# Patient Record
Sex: FEMALE | Race: WHITE | Employment: OTHER | URBAN - METROPOLITAN AREA
[De-identification: names, ages, dates, MRNs, and addresses within clinical notes are randomized per-mention and may not be internally consistent; named-entity substitution may affect disease eponyms.]

---

## 2023-01-09 ENCOUNTER — HOSPITAL ENCOUNTER (INPATIENT)
Age: 68
LOS: 4 days | Discharge: HOME | End: 2023-01-13
Attending: EMERGENCY MEDICINE | Admitting: INTERNAL MEDICINE
Payer: MEDICARE

## 2023-01-09 ENCOUNTER — APPOINTMENT (OUTPATIENT)
Dept: CT IMAGING | Age: 68
End: 2023-01-09
Payer: MEDICARE

## 2023-01-09 DIAGNOSIS — E66.9 DIABETES MELLITUS TYPE 2 IN OBESE (HCC): ICD-10-CM

## 2023-01-09 DIAGNOSIS — R10.9 ABDOMINAL PAIN IN FEMALE: Primary | ICD-10-CM

## 2023-01-09 DIAGNOSIS — E11.69 DIABETES MELLITUS TYPE 2 IN OBESE (HCC): ICD-10-CM

## 2023-01-09 DIAGNOSIS — N39.0 URINARY TRACT INFECTION IN FEMALE: ICD-10-CM

## 2023-01-09 DIAGNOSIS — R11.2 NAUSEA AND VOMITING, UNSPECIFIED VOMITING TYPE: ICD-10-CM

## 2023-01-09 LAB
A/G RATIO: 1 (ref 1.1–2.2)
ALBUMIN SERPL-MCNC: 3.5 G/DL (ref 3.4–5)
ALP BLD-CCNC: 97 U/L (ref 40–129)
ALT SERPL-CCNC: 10 U/L (ref 10–40)
ANION GAP SERPL CALCULATED.3IONS-SCNC: 11 MMOL/L (ref 3–16)
AST SERPL-CCNC: 16 U/L (ref 15–37)
BACTERIA: ABNORMAL /HPF
BASOPHILS ABSOLUTE: 0 K/UL (ref 0–0.2)
BASOPHILS RELATIVE PERCENT: 0.5 %
BILIRUB SERPL-MCNC: 0.4 MG/DL (ref 0–1)
BILIRUBIN URINE: ABNORMAL
BLOOD, URINE: NEGATIVE
BUN BLDV-MCNC: 14 MG/DL (ref 7–20)
CALCIUM SERPL-MCNC: 9.1 MG/DL (ref 8.3–10.6)
CHLORIDE BLD-SCNC: 102 MMOL/L (ref 99–110)
CLARITY: ABNORMAL
CO2: 24 MMOL/L (ref 21–32)
COLOR: ABNORMAL
CREAT SERPL-MCNC: 1 MG/DL (ref 0.6–1.2)
EOSINOPHILS ABSOLUTE: 0.1 K/UL (ref 0–0.6)
EOSINOPHILS RELATIVE PERCENT: 0.7 %
EPITHELIAL CELLS, UA: 15 /HPF (ref 0–5)
GFR SERPL CREATININE-BSD FRML MDRD: >60 ML/MIN/{1.73_M2}
GLUCOSE BLD-MCNC: 131 MG/DL (ref 70–99)
GLUCOSE BLD-MCNC: 179 MG/DL (ref 70–99)
GLUCOSE URINE: NEGATIVE MG/DL
HCT VFR BLD CALC: 34.6 % (ref 36–48)
HEMOGLOBIN: 11.2 G/DL (ref 12–16)
HYALINE CASTS: 36 /LPF (ref 0–8)
KETONES, URINE: 15 MG/DL
LEUKOCYTE ESTERASE, URINE: ABNORMAL
LIPASE: 7 U/L (ref 13–60)
LYMPHOCYTES ABSOLUTE: 1.9 K/UL (ref 1–5.1)
LYMPHOCYTES RELATIVE PERCENT: 25.7 %
MCH RBC QN AUTO: 26.8 PG (ref 26–34)
MCHC RBC AUTO-ENTMCNC: 32.3 G/DL (ref 31–36)
MCV RBC AUTO: 83.1 FL (ref 80–100)
MICROSCOPIC EXAMINATION: YES
MONOCYTES ABSOLUTE: 0.5 K/UL (ref 0–1.3)
MONOCYTES RELATIVE PERCENT: 7.3 %
NEUTROPHILS ABSOLUTE: 4.9 K/UL (ref 1.7–7.7)
NEUTROPHILS RELATIVE PERCENT: 65.8 %
NITRITE, URINE: POSITIVE
PDW BLD-RTO: 14 % (ref 12.4–15.4)
PERFORMED ON: ABNORMAL
PH UA: 5.5 (ref 5–8)
PLATELET # BLD: 304 K/UL (ref 135–450)
PMV BLD AUTO: 7 FL (ref 5–10.5)
POTASSIUM REFLEX MAGNESIUM: 4.2 MMOL/L (ref 3.5–5.1)
PROTEIN UA: 30 MG/DL
RBC # BLD: 4.17 M/UL (ref 4–5.2)
RBC UA: 2 /HPF (ref 0–4)
SODIUM BLD-SCNC: 137 MMOL/L (ref 136–145)
SPECIFIC GRAVITY UA: 1.02 (ref 1–1.03)
TOTAL PROTEIN: 6.9 G/DL (ref 6.4–8.2)
TROPONIN: <0.01 NG/ML
URINE REFLEX TO CULTURE: YES
URINE TYPE: ABNORMAL
UROBILINOGEN, URINE: 1 E.U./DL
WBC # BLD: 7.5 K/UL (ref 4–11)
WBC UA: 123 /HPF (ref 0–5)

## 2023-01-09 PROCEDURE — 81001 URINALYSIS AUTO W/SCOPE: CPT

## 2023-01-09 PROCEDURE — 80053 COMPREHEN METABOLIC PANEL: CPT

## 2023-01-09 PROCEDURE — 83690 ASSAY OF LIPASE: CPT

## 2023-01-09 PROCEDURE — 87186 SC STD MICRODIL/AGAR DIL: CPT

## 2023-01-09 PROCEDURE — 87086 URINE CULTURE/COLONY COUNT: CPT

## 2023-01-09 PROCEDURE — 96374 THER/PROPH/DIAG INJ IV PUSH: CPT

## 2023-01-09 PROCEDURE — 85025 COMPLETE CBC W/AUTO DIFF WBC: CPT

## 2023-01-09 PROCEDURE — 6360000004 HC RX CONTRAST MEDICATION: Performed by: EMERGENCY MEDICINE

## 2023-01-09 PROCEDURE — 99285 EMERGENCY DEPT VISIT HI MDM: CPT

## 2023-01-09 PROCEDURE — 74177 CT ABD & PELVIS W/CONTRAST: CPT

## 2023-01-09 PROCEDURE — 87088 URINE BACTERIA CULTURE: CPT

## 2023-01-09 PROCEDURE — 84484 ASSAY OF TROPONIN QUANT: CPT

## 2023-01-09 PROCEDURE — 96375 TX/PRO/DX INJ NEW DRUG ADDON: CPT

## 2023-01-09 PROCEDURE — 2580000003 HC RX 258: Performed by: INTERNAL MEDICINE

## 2023-01-09 PROCEDURE — 1200000000 HC SEMI PRIVATE

## 2023-01-09 PROCEDURE — 6360000002 HC RX W HCPCS: Performed by: EMERGENCY MEDICINE

## 2023-01-09 PROCEDURE — 6360000002 HC RX W HCPCS: Performed by: INTERNAL MEDICINE

## 2023-01-09 RX ORDER — SODIUM CHLORIDE 0.9 % (FLUSH) 0.9 %
5-40 SYRINGE (ML) INJECTION PRN
Status: DISCONTINUED | OUTPATIENT
Start: 2023-01-09 | End: 2023-01-13 | Stop reason: HOSPADM

## 2023-01-09 RX ORDER — INSULIN LISPRO 100 [IU]/ML
0-4 INJECTION, SOLUTION INTRAVENOUS; SUBCUTANEOUS NIGHTLY
Status: DISCONTINUED | OUTPATIENT
Start: 2023-01-09 | End: 2023-01-13 | Stop reason: HOSPADM

## 2023-01-09 RX ORDER — METOPROLOL TARTRATE 50 MG/1
50 TABLET, FILM COATED ORAL DAILY
Status: DISCONTINUED | OUTPATIENT
Start: 2023-01-10 | End: 2023-01-11

## 2023-01-09 RX ORDER — SODIUM CHLORIDE 9 MG/ML
INJECTION, SOLUTION INTRAVENOUS PRN
Status: DISCONTINUED | OUTPATIENT
Start: 2023-01-09 | End: 2023-01-13 | Stop reason: HOSPADM

## 2023-01-09 RX ORDER — ONDANSETRON 4 MG/1
4 TABLET, ORALLY DISINTEGRATING ORAL EVERY 8 HOURS PRN
Status: DISCONTINUED | OUTPATIENT
Start: 2023-01-09 | End: 2023-01-13 | Stop reason: HOSPADM

## 2023-01-09 RX ORDER — ENOXAPARIN SODIUM 100 MG/ML
30 INJECTION SUBCUTANEOUS 2 TIMES DAILY
Status: DISCONTINUED | OUTPATIENT
Start: 2023-01-10 | End: 2023-01-13 | Stop reason: HOSPADM

## 2023-01-09 RX ORDER — INSULIN LISPRO 100 [IU]/ML
0-8 INJECTION, SOLUTION INTRAVENOUS; SUBCUTANEOUS
Status: DISCONTINUED | OUTPATIENT
Start: 2023-01-10 | End: 2023-01-13 | Stop reason: HOSPADM

## 2023-01-09 RX ORDER — ONDANSETRON 2 MG/ML
4 INJECTION INTRAMUSCULAR; INTRAVENOUS EVERY 6 HOURS PRN
Status: DISCONTINUED | OUTPATIENT
Start: 2023-01-09 | End: 2023-01-13 | Stop reason: HOSPADM

## 2023-01-09 RX ORDER — SODIUM CHLORIDE 0.9 % (FLUSH) 0.9 %
5-40 SYRINGE (ML) INJECTION EVERY 12 HOURS SCHEDULED
Status: DISCONTINUED | OUTPATIENT
Start: 2023-01-09 | End: 2023-01-13 | Stop reason: HOSPADM

## 2023-01-09 RX ORDER — ACETAMINOPHEN 650 MG/1
650 SUPPOSITORY RECTAL EVERY 6 HOURS PRN
Status: DISCONTINUED | OUTPATIENT
Start: 2023-01-09 | End: 2023-01-13 | Stop reason: HOSPADM

## 2023-01-09 RX ORDER — ACETAMINOPHEN 325 MG/1
650 TABLET ORAL EVERY 6 HOURS PRN
Status: DISCONTINUED | OUTPATIENT
Start: 2023-01-09 | End: 2023-01-13 | Stop reason: HOSPADM

## 2023-01-09 RX ORDER — ONDANSETRON 2 MG/ML
4 INJECTION INTRAMUSCULAR; INTRAVENOUS ONCE
Status: COMPLETED | OUTPATIENT
Start: 2023-01-09 | End: 2023-01-09

## 2023-01-09 RX ORDER — SODIUM CHLORIDE, SODIUM LACTATE, POTASSIUM CHLORIDE, CALCIUM CHLORIDE 600; 310; 30; 20 MG/100ML; MG/100ML; MG/100ML; MG/100ML
INJECTION, SOLUTION INTRAVENOUS CONTINUOUS
Status: DISCONTINUED | OUTPATIENT
Start: 2023-01-09 | End: 2023-01-13 | Stop reason: HOSPADM

## 2023-01-09 RX ORDER — KETOROLAC TROMETHAMINE 30 MG/ML
15 INJECTION, SOLUTION INTRAMUSCULAR; INTRAVENOUS ONCE
Status: COMPLETED | OUTPATIENT
Start: 2023-01-09 | End: 2023-01-09

## 2023-01-09 RX ORDER — POLYETHYLENE GLYCOL 3350 17 G/17G
17 POWDER, FOR SOLUTION ORAL DAILY PRN
Status: DISCONTINUED | OUTPATIENT
Start: 2023-01-09 | End: 2023-01-13 | Stop reason: HOSPADM

## 2023-01-09 RX ADMIN — KETOROLAC TROMETHAMINE 15 MG: 30 INJECTION, SOLUTION INTRAMUSCULAR; INTRAVENOUS at 16:11

## 2023-01-09 RX ADMIN — CEFTRIAXONE SODIUM 1000 MG: 1 INJECTION, POWDER, FOR SOLUTION INTRAMUSCULAR; INTRAVENOUS at 22:43

## 2023-01-09 RX ADMIN — SODIUM CHLORIDE, POTASSIUM CHLORIDE, SODIUM LACTATE AND CALCIUM CHLORIDE: 600; 310; 30; 20 INJECTION, SOLUTION INTRAVENOUS at 22:41

## 2023-01-09 RX ADMIN — IOPAMIDOL 75 ML: 755 INJECTION, SOLUTION INTRAVENOUS at 17:10

## 2023-01-09 RX ADMIN — Medication 10 ML: at 22:42

## 2023-01-09 RX ADMIN — ONDANSETRON 4 MG: 2 INJECTION INTRAMUSCULAR; INTRAVENOUS at 16:10

## 2023-01-09 ASSESSMENT — PAIN SCALES - GENERAL
PAINLEVEL_OUTOF10: 7
PAINLEVEL_OUTOF10: 7

## 2023-01-09 ASSESSMENT — LIFESTYLE VARIABLES: HOW OFTEN DO YOU HAVE A DRINK CONTAINING ALCOHOL: NEVER

## 2023-01-09 ASSESSMENT — PAIN DESCRIPTION - PAIN TYPE: TYPE: ACUTE PAIN;CHRONIC PAIN

## 2023-01-09 ASSESSMENT — PAIN - FUNCTIONAL ASSESSMENT: PAIN_FUNCTIONAL_ASSESSMENT: 0-10

## 2023-01-09 ASSESSMENT — PAIN DESCRIPTION - LOCATION: LOCATION: ABDOMEN

## 2023-01-10 LAB
ANION GAP SERPL CALCULATED.3IONS-SCNC: 13 MMOL/L (ref 3–16)
BUN BLDV-MCNC: 11 MG/DL (ref 7–20)
CALCIUM SERPL-MCNC: 9.2 MG/DL (ref 8.3–10.6)
CHLORIDE BLD-SCNC: 106 MMOL/L (ref 99–110)
CO2: 24 MMOL/L (ref 21–32)
CREAT SERPL-MCNC: 0.9 MG/DL (ref 0.6–1.2)
GFR SERPL CREATININE-BSD FRML MDRD: >60 ML/MIN/{1.73_M2}
GLUCOSE BLD-MCNC: 121 MG/DL (ref 70–99)
GLUCOSE BLD-MCNC: 126 MG/DL (ref 70–99)
GLUCOSE BLD-MCNC: 137 MG/DL (ref 70–99)
GLUCOSE BLD-MCNC: 160 MG/DL (ref 70–99)
PERFORMED ON: ABNORMAL
POTASSIUM REFLEX MAGNESIUM: 3.9 MMOL/L (ref 3.5–5.1)
SODIUM BLD-SCNC: 143 MMOL/L (ref 136–145)

## 2023-01-10 PROCEDURE — 6360000002 HC RX W HCPCS: Performed by: PHYSICIAN ASSISTANT

## 2023-01-10 PROCEDURE — 2580000003 HC RX 258: Performed by: INTERNAL MEDICINE

## 2023-01-10 PROCEDURE — 6370000000 HC RX 637 (ALT 250 FOR IP): Performed by: INTERNAL MEDICINE

## 2023-01-10 PROCEDURE — 36415 COLL VENOUS BLD VENIPUNCTURE: CPT

## 2023-01-10 PROCEDURE — 6370000000 HC RX 637 (ALT 250 FOR IP): Performed by: PHYSICIAN ASSISTANT

## 2023-01-10 PROCEDURE — 6360000002 HC RX W HCPCS: Performed by: INTERNAL MEDICINE

## 2023-01-10 PROCEDURE — C9113 INJ PANTOPRAZOLE SODIUM, VIA: HCPCS | Performed by: PHYSICIAN ASSISTANT

## 2023-01-10 PROCEDURE — 80048 BASIC METABOLIC PNL TOTAL CA: CPT

## 2023-01-10 PROCEDURE — 1200000000 HC SEMI PRIVATE

## 2023-01-10 RX ORDER — BUPRENORPHINE AND NALOXONE 2; .5 MG/1; MG/1
3 FILM, SOLUBLE BUCCAL; SUBLINGUAL DAILY
Status: DISCONTINUED | OUTPATIENT
Start: 2023-01-10 | End: 2023-01-13 | Stop reason: HOSPADM

## 2023-01-10 RX ORDER — METOCLOPRAMIDE HYDROCHLORIDE 5 MG/ML
10 INJECTION INTRAMUSCULAR; INTRAVENOUS EVERY 6 HOURS
Status: DISCONTINUED | OUTPATIENT
Start: 2023-01-10 | End: 2023-01-10

## 2023-01-10 RX ORDER — PANTOPRAZOLE SODIUM 40 MG/10ML
40 INJECTION, POWDER, LYOPHILIZED, FOR SOLUTION INTRAVENOUS DAILY
Status: DISCONTINUED | OUTPATIENT
Start: 2023-01-10 | End: 2023-01-13 | Stop reason: HOSPADM

## 2023-01-10 RX ORDER — METOCLOPRAMIDE HYDROCHLORIDE 5 MG/ML
10 INJECTION INTRAMUSCULAR; INTRAVENOUS ONCE
Status: COMPLETED | OUTPATIENT
Start: 2023-01-10 | End: 2023-01-10

## 2023-01-10 RX ORDER — DEXTROSE MONOHYDRATE 100 MG/ML
INJECTION, SOLUTION INTRAVENOUS CONTINUOUS PRN
Status: DISCONTINUED | OUTPATIENT
Start: 2023-01-10 | End: 2023-01-13 | Stop reason: HOSPADM

## 2023-01-10 RX ORDER — SCOLOPAMINE TRANSDERMAL SYSTEM 1 MG/1
1 PATCH, EXTENDED RELEASE TRANSDERMAL
Status: DISCONTINUED | OUTPATIENT
Start: 2023-01-10 | End: 2023-01-13 | Stop reason: HOSPADM

## 2023-01-10 RX ORDER — BUPRENORPHINE AND NALOXONE 2; .5 MG/1; MG/1
1 FILM, SOLUBLE BUCCAL; SUBLINGUAL DAILY
Status: CANCELLED | OUTPATIENT
Start: 2023-01-10

## 2023-01-10 RX ADMIN — METOPROLOL TARTRATE 50 MG: 50 TABLET, FILM COATED ORAL at 08:45

## 2023-01-10 RX ADMIN — ENOXAPARIN SODIUM 30 MG: 100 INJECTION SUBCUTANEOUS at 22:31

## 2023-01-10 RX ADMIN — PROMETHAZINE HYDROCHLORIDE: 25 INJECTION INTRAMUSCULAR; INTRAVENOUS at 11:11

## 2023-01-10 RX ADMIN — Medication 10 ML: at 20:55

## 2023-01-10 RX ADMIN — PANTOPRAZOLE SODIUM 40 MG: 40 INJECTION, POWDER, FOR SOLUTION INTRAVENOUS at 13:35

## 2023-01-10 RX ADMIN — ONDANSETRON 4 MG: 2 INJECTION INTRAMUSCULAR; INTRAVENOUS at 04:56

## 2023-01-10 RX ADMIN — CEFTRIAXONE SODIUM 1000 MG: 1 INJECTION, POWDER, FOR SOLUTION INTRAMUSCULAR; INTRAVENOUS at 22:37

## 2023-01-10 RX ADMIN — ONDANSETRON 4 MG: 2 INJECTION INTRAMUSCULAR; INTRAVENOUS at 10:24

## 2023-01-10 RX ADMIN — METOCLOPRAMIDE 10 MG: 5 INJECTION, SOLUTION INTRAMUSCULAR; INTRAVENOUS at 13:35

## 2023-01-10 RX ADMIN — ENOXAPARIN SODIUM 30 MG: 100 INJECTION SUBCUTANEOUS at 08:45

## 2023-01-10 RX ADMIN — BUPRENORPHINE AND NALOXONE 3 FILM: 2; .5 FILM BUCCAL; SUBLINGUAL at 16:48

## 2023-01-10 NOTE — PROGRESS NOTES
[facility] PROGRESS NOTE    1/10/2023 9:17 AM        Name: Nathan Dolan . Admitted: 1/9/2023  Primary Care Provider: Mark Reddy (Tel: [phone])      Subjective:  . Patient seen this am. She was actively vomiting in front of me.  at bedside. I did order phenergan and reassessed her later. She was much more comfortable. She had been on norco for 8 years. She weaned off of these late last year. She then started using marijuana gummies then went to [facility] and is on suboxone which was recently increased to 6 mg daily. She has had almost intractable vomiting since early December although her  says she can go 2 or 3 days without vomiting. They do not think she has been losing weight despite all the vomiting. She could not complete gastric emptying scan the other day due to vomiting. Denies abdominal pain. Does have chronic constipation since she has been on opiates. Has never been on a bowel regimen. She does have urinary frequency but denies dysuria.  \      Reviewed interval ancillary notes    Current Medications  metoprolol tartrate (LOPRESSOR) tablet 50 mg, Daily  cefTRIAXone (ROCEPHIN) 1,000 mg in dextrose 5 % 50 mL IVPB mini-bag, Q24H  insulin lispro (HUMALOG) injection vial 0-8 Units, TID WC  insulin lispro (HUMALOG) injection vial 0-4 Units, Nightly  sodium chloride flush 0.9 % injection 5-40 mL, 2 times per day  sodium chloride flush 0.9 % injection 5-40 mL, PRN  0.9 % sodium chloride infusion, PRN  enoxaparin Sodium (LOVENOX) injection 30 mg, BID  ondansetron (ZOFRAN-ODT) disintegrating tablet 4 mg, Q8H PRN   Or  ondansetron (ZOFRAN) injection 4 mg, Q6H PRN  polyethylene glycol (GLYCOLAX) packet 17 g, Daily PRN  acetaminophen (TYLENOL) tablet 650 mg, Q6H PRN   Or  acetaminophen (TYLENOL) suppository 650 mg, Q6H PRN  lactated ringers infusion, Continuous        Objective:  BP (!) 140/91   Pulse 78   Temp 98 °F (36.7 °C) (Oral)   Resp 18   Ht 5' 6\" (1.676 m)   Wt 240 lb (108.9 kg)   SpO2 97%   BMI 38.74 kg/m²   No intake or output data in the 24 hours ending 01/10/23 0917   Wt Readings from Last 3 Encounters:   01/09/23 240 lb (108.9 kg)       General appearance:  Appears comfortable after phenergan  Eyes: Sclera clear. Pupils equal.  ENT: Moist oral mucosa. Trachea midline, no adenopathy. Cardiovascular: Regular rhythm, normal S1, S2. No murmur. No edema in lower extremities  Respiratory: Not using accessory muscles. Good inspiratory effort. Clear to auscultation bilaterally, no wheeze or crackles. GI: Abdomen soft, no tenderness, not distended, normal bowel sounds  Musculoskeletal: No cyanosis in digits, neck supple  Neurology: CN 2-12 grossly intact. No speech or motor deficits  Psych: Normal affect. Alert and oriented in time, place and person  Skin: Warm, dry, normal turgor    Labs and Tests:  CBC:   Recent Labs     01/09/23  1605   WBC 7.5   HGB 11.2*        BMP:    Recent Labs     01/09/23  1605 01/10/23  0521    143   K 4.2 3.9    106   CO2 24 24   BUN 14 11   CREATININE 1.0 0.9   GLUCOSE 179* 121*     Hepatic:   Recent Labs     01/09/23  1605   AST 16   ALT 10   BILITOT 0.4   ALKPHOS 97     C abd/pelvis  Impression:     1. Moderate-sized hiatus hernia. 2. Negative for bowel obstruction. Problem List  Principal Problem:    UTI (urinary tract infection)  Resolved Problems:    * No resolved hospital problems. *       Assessment & Plan:   UTI-continue rocephin pending cultures  Intractable nausea and vomiting-CT unremarkable. IV phenergan added. Give dose of reglan. ? EGD vs gastric emptying scan once she can tolerate. Dm  2-on amaryl and [place] at home. SSI for now      Diet: ADULT DIET;  Full Liquid; 5 carb choices (75 gm/meal)  Code:Full Code        Elana Ngo PA-C   1/10/2023 9:17 AM

## 2023-01-10 NOTE — PLAN OF CARE
Problem: Discharge Planning  Goal: Discharge to home or other facility with appropriate resources  Outcome: Progressing  Flowsheets (Taken 1/9/2023 2372)  Discharge to home or other facility with appropriate resources:   Identify barriers to discharge with patient and caregiver   Arrange for needed discharge resources and transportation as appropriate     Problem: Safety - Adult  Goal: Free from fall injury  Outcome: Progressing

## 2023-01-10 NOTE — CARE COORDINATION
Discharge Planning Note:    Chart reviewed and it appears that patient has minimal needs for discharge at this time. Discussed with patient and requested that case management be notified if discharge needs are identified.     - Current discharge plan is for the patient to return home. - PCP: Becca Bertrand    Case management will continue to follow progress and update discharge plan as needed.       Risk of Readmission Score: 4%    AREN Aguirre RN    [facility]  Phone: [phone]

## 2023-01-10 NOTE — PLAN OF CARE
Problem: Discharge Planning  Goal: Discharge to home or other facility with appropriate resources  1/10/2023 0842 by Laureano Hardwick RN  Outcome: Progressing  1/10/2023 0155 by Alverto Rowland RN  Outcome: Progressing  Flowsheets (Taken 1/9/2023 2245)  Discharge to home or other facility with appropriate resources:   Identify barriers to discharge with patient and caregiver   Arrange for needed discharge resources and transportation as appropriate     Problem: Safety - Adult  Goal: Free from fall injury  1/10/2023 0842 by Laureano Hardwick RN  Outcome: Progressing  1/10/2023 0155 by Alverto Rowland RN  Outcome: Progressing

## 2023-01-10 NOTE — H&P
HOSPITALISTS HISTORY AND PHYSICAL    1/9/2023 7:19 PM    Patient Information:  Kristina Schwartz is a 79 y.o. female [id number]  PCP:  Gideon Segal (Tel: [phone] )    Chief complaint:    Chief Complaint   Patient presents with    Abdominal Pain     Arrived per family d/t abd pain, vomiting, bowel inpaction PCP called and gave dx and told to come to ER       History of Present Illness:  Gustavo Dailey is a 79 y.o. female   Hospital by her GI doctor as patient has been having persistent nausea with vomiting unable to keep food down for the last couple days has a history of dysphagia denies any chest pain fevers but is having some chills no burning with urination but having frequent urination. REVIEW OF SYSTEMS:   Constitutional: see above  ENT: Negative for rhinorrhea, epistaxis, hoarseness, sore throat. Respiratory: Negative for shortness of breath,wheezing  Cardiovascular: Negative for chest pain, palpitations   Gastrointestinal:see above  Genitourinary: Negative for polyuria, dysuria   Hematologic/Lymphatic: Negative for bleeding tendency, easy bruising  Musculoskeletal: Negative for myalgias and arthralgias  Neurologic: Negative for confusion,dysarthria. Skin: Negative for itching,rash  Psychiatric: Negative for depression,anxiety, agitation. Endocrine: Negative for polydipsia,polyuria,heat /cold intolerance. Past Medical History:   has a past medical history of Anxiety, Diabetes mellitus ([address] Hyperlipidemia, MVP (mitral valve prolapse), and Neck pain. Past Surgical History:   has a past surgical history that includes Cholecystectomy and Hysterectomy. Medications:  No current facility-administered medications on file prior to encounter.      Current Outpatient Medications on File Prior to Encounter   Medication Sig Dispense Refill    sitaGLIPtan (JANUVIA) 100 MG tablet Take 100 mg by mouth daily. HYDROcodone-acetaminophen (NORCO) 5-325 MG per tablet Take 1 tablet by mouth every 6 hours as needed. metformin (GLUCOPHAGE) 1000 MG tablet Take 1,000 mg by mouth 2 times daily (with meals). glimepiride (AMARYL) 4 MG tablet Take 4 mg by mouth 2 times daily. citalopram (CELEXA) 40 MG tablet Take 40 mg by mouth See Admin Instructions. 1 1/2 TABS DAILY      metoprolol (LOPRESSOR) 50 MG tablet Take 50 mg by mouth daily. estrogens conjugated, synthetic A, (CENESTIN) 1.25 MG tablet Take 1.25 mg by mouth daily. Allergies:  No Known Allergies     Social History:  Patient Lives at home   reports that she has never smoked. She does not have any smokeless tobacco history on file. She reports that she does not drink alcohol and does not use drugs. Family History:  unaware  Physical Exam:  BP (!) 144/71   Pulse 56   Temp 98.1 °F (36.7 °C)   Resp 14   Ht 5' 6\" (1.676 m)   Wt 240 lb (108.9 kg)   SpO2 97%   BMI 38.74 kg/m²     General appearance:  Appears comfortable. AAOx3  HEENT: atraumatic, Pupils equal, muscous membranes moist, no masses appreciated  Cardiovascular: Regular rate and rhythm no murmurs appreciated  Respiratory: CTAB no wheezing  Gastrointestinal: Abdomen soft, non-tender, BS+  EXT: no edema  Neurology: no gross focal deficts  Psychiatry: Appropriate affect.  Not agitated  Skin: Warm, dry, no rashes appreciated    Labs:  CBC:   Lab Results   Component Value Date/Time    WBC 7.5 01/09/2023 04:05 PM    RBC 4.17 01/09/2023 04:05 PM    HGB 11.2 01/09/2023 04:05 PM    HCT 34.6 01/09/2023 04:05 PM    MCV 83.1 01/09/2023 04:05 PM    MCH 26.8 01/09/2023 04:05 PM    MCHC 32.3 01/09/2023 04:05 PM    RDW 14.0 01/09/2023 04:05 PM     01/09/2023 04:05 PM    MPV 7.0 01/09/2023 04:05 PM     BMP:    Lab Results   Component Value Date/Time     01/09/2023 04:05 PM    K 4.2 01/09/2023 04:05 PM     01/09/2023 04:05 PM    CO2 24 01/09/2023 04:05 PM    BUN 14 01/09/2023 04:05 PM    CREATININE 1.0 01/09/2023 04:05 PM    CALCIUM 9.1 01/09/2023 04:05 PM    LABGLOM >60 01/09/2023 04:05 PM    GLUCOSE 179 01/09/2023 04:05 PM     CT ABDOMEN PELVIS W IV CONTRAST Additional Contrast? None   Final Result   1. Moderate-sized hiatus hernia. 2. Negative for bowel obstruction. Recent imaging reviewed    Problem List  Principal Problem:    UTI (urinary tract infection)  Resolved Problems:    * No resolved hospital problems. *        Assessment/Plan:   Uti   Iv atbx   Fu cutlurs    Peristent nausea:   -zofran  - start on full liquids  = sent in by Dr. Melissa Ambrose will get on board    Dm2 ssi    DVT prophylaxis lovenox  Code status full code        Admit as inpatient I anticipate hospitalization spanning more than two midnights for investigation and treatment of the above medically necessary diagnoses. Please note that some part of this chart was generated using Dragon dictation software. Although every effort was made to ensure the accuracy of this automated transcription, some errors in transcription may have occurred inadvertently. If you may need any clarification, please do not hesitate to contact me through [facility].        Eleno Oneil MD    1/9/2023 7:19 PM

## 2023-01-10 NOTE — ED PROVIDER NOTES
[id number] [facility] Emergency Department      Pt Name: Dorothy Mota  MRN: [id number]  [place] 1955  Date of evaluation: 1/9/2023  Provider: Ofelia Conway MD  CHIEF COMPLAINT  Chief Complaint   Patient presents with    Abdominal Pain     Arrived per family d/t abd pain, vomiting, bowel inpaction PCP called and gave dx and told to come to ER       HPI  Dorothy Mota is a 79 y.o. female who presents because of abdominal pain. She has been seeing GI, has continued symptoms. She has had nausea and vomiting since last week with pain. She points to the central abdomen. She denies chest pain or sob. No blood in emesis and last emesis was yesterday. Her ability to eat is very limited. She was sent to the hospital for admission. Denies any known fever. PSH of cholecystectomy and hysterectomy. REVIEW OF SYSTEMS:  No fever, no breathing difficulty, no dysuria Pertinent positives and negatives as per the HPI. All other pertinent review of systems reviewed and negative. Nursing notes reviewed. PAST MEDICAL HISTORY:  Past Medical History:   Diagnosis Date    Anxiety     Diabetes mellitus ([address]     Hyperlipidemia     MVP (mitral valve prolapse)     Neck pain      SURGICAL HISTORY  Past Surgical History:   Procedure Laterality Date    CHOLECYSTECTOMY      HYSTERECTOMY (CERVIX STATUS UNKNOWN)       MEDICATIONS:  No current facility-administered medications on file prior to encounter. Current Outpatient Medications on File Prior to Encounter   Medication Sig Dispense Refill    sitaGLIPtan (JANUVIA) 100 MG tablet Take 100 mg by mouth daily. (Patient not taking: Reported on 1/9/2023)      HYDROcodone-acetaminophen (NORCO) 5-325 MG per tablet Take 1 tablet by mouth every 6 hours as needed. metformin (GLUCOPHAGE) 1000 MG tablet Take 1,000 mg by mouth 2 times daily (with meals). glimepiride (AMARYL) 4 MG tablet Take 4 mg by mouth 2 times daily.  (Patient not taking: Reported on 1/9/2023) citalopram (CELEXA) 40 MG tablet Take 40 mg by mouth See Admin Instructions. 1 1/2 TABS DAILY      metoprolol (LOPRESSOR) 50 MG tablet Take 50 mg by mouth daily. estrogens conjugated, synthetic A, (CENESTIN) 1.25 MG tablet Take 1.25 mg by mouth daily. (Patient not taking: Reported on 1/9/2023)       ALLERGIES  Patient has no known allergies. FAMILY HISTORY:  History reviewed. No pertinent family history. SOCIAL HISTORY:  Social History     Tobacco Use    Smoking status: Never   Substance Use Topics    Alcohol use: No    Drug use: No     IMMUNIZATIONS:  Noncontributory    PHYSICAL EXAM  VITAL SIGNS: /78   Pulse 61   Temp 98.2 °F (36.8 °C) (Oral)   Resp 16   Ht 5' 6\" (1.676 m)   Wt 240 lb (108.9 kg)   SpO2 98%   BMI 38.74 kg/m²   Constitutional:  79 y.o. female cooperative  HENT:  Atraumatic, mucous membranes moist  Eyes:   Conjunctiva clear, no icterus  Neck:  Supple, no adenopathy  Cardiovascular:  Regular, no rubs  Thorax & Lungs:  No accessory muscle usage, clear  Abdomen:  Soft, non distended, bowel sounds present, mild lower tenderness without r/g  Back:  No deformity  Genitalia:  Deferred  Rectal:  Deferred  Extremities:  No cyanosis, no edema  Skin:  Warm, dry, no rash of exposed areas  Neurologic:  Alert, no slurred speech  Psychiatric:  Affect appropriate    RESULTS / MEDICAL DECISION MAKING:  Labs resulted at the time of this note reviewed.    Labs Reviewed   CBC WITH AUTO DIFFERENTIAL - Abnormal; Notable for the following components:       Result Value    Hemoglobin 11.2 (*)     Hematocrit 34.6 (*)     All other components within normal limits   COMPREHENSIVE METABOLIC PANEL W/ REFLEX TO MG FOR LOW K - Abnormal; Notable for the following components:    Glucose 179 (*)     Albumin/Globulin Ratio 1.0 (*)     All other components within normal limits   LIPASE - Abnormal; Notable for the following components:    Lipase 7.0 (*)     All other components within normal limits   URINALYSIS WITH REFLEX TO CULTURE - Abnormal; Notable for the following components:    Color, UA DARK YELLOW (*)     Clarity, UA TURBID (*)     Bilirubin Urine SMALL (*)     Ketones, Urine 15 (*)     Protein, UA 30 (*)     Nitrite, Urine POSITIVE (*)     Leukocyte Esterase, Urine MODERATE (*)     All other components within normal limits   MICROSCOPIC URINALYSIS - Abnormal; Notable for the following components:    Bacteria, UA 4+ (*)     Hyaline Casts, UA 36 (*)     WBC,  (*)     Epithelial Cells, UA 15 (*)     All other components within normal limits   POCT GLUCOSE - Abnormal; Notable for the following components:    POC Glucose 131 (*)     All other components within normal limits   CULTURE, URINE   TROPONIN   BASIC METABOLIC PANEL W/ REFLEX TO MG FOR LOW K     RADIOLOGY:   CT ABDOMEN PELVIS W IV CONTRAST Additional Contrast? None   Final Result   1. Moderate-sized hiatus hernia. 2. Negative for bowel obstruction.            ED COURSE:  Medications administered:  Medications   metoprolol tartrate (LOPRESSOR) tablet 50 mg (has no administration in time range)   cefTRIAXone (ROCEPHIN) 1,000 mg in dextrose 5 % 50 mL IVPB mini-bag (0 mg IntraVENous Stopped 1/9/23 2354)   insulin lispro (HUMALOG) injection vial 0-8 Units (has no administration in time range)   insulin lispro (HUMALOG) injection vial 0-4 Units (0 Units SubCUTAneous Not Given 1/9/23 2242)   sodium chloride flush 0.9 % injection 5-40 mL (10 mLs IntraVENous Given 1/9/23 2242)   sodium chloride flush 0.9 % injection 5-40 mL (has no administration in time range)   0.9 % sodium chloride infusion (has no administration in time range)   enoxaparin Sodium (LOVENOX) injection 30 mg (has no administration in time range)   ondansetron (ZOFRAN-ODT) disintegrating tablet 4 mg (has no administration in time range)     Or   ondansetron (ZOFRAN) injection 4 mg (has no administration in time range)   polyethylene glycol (GLYCOLAX) packet 17 g (has no administration in time range)   acetaminophen (TYLENOL) tablet 650 mg (has no administration in time range)     Or   acetaminophen (TYLENOL) suppository 650 mg (has no administration in time range)   lactated ringers infusion ( IntraVENous New Bag 1/9/23 2241)   ondansetron (ZOFRAN) injection 4 mg (4 mg IntraVENous Given 1/9/23 1610)   ketorolac (TORADOL) injection 15 mg (15 mg IntraVENous Given 1/9/23 1611)   iopamidol (ISOVUE-370) 76 % injection 75 mL (75 mLs IntraVENous Given 1/9/23 1710)     History from : Patient  Limitations to history : pt poor historian  Chronic Conditions:  has a past medical history of Anxiety, Diabetes mellitus ([address] Hyperlipidemia, MVP (mitral valve prolapse), and Neck pain. Discussion with Other Profesionals : Admitting Team and Dr Kerwin Devries : Outpatient Labs & Studies x rays, gi test  Disposition Considerations (Tests not ordered but considered, Shared Decision Making, Pt Expectation of Test or Tx.):  Amylase considered but not ordered due to nonspecific nature of the test      PROCEDURES:  None    CRITICAL CARE:  None    CONSULTATIONS:  Hospitalist, Dr Sheila Rosario is a 79 y.o. female who presented because of abdominal pain. She does have UTI. Abx initiated. Her GI specialist requests admission. He tried to admit her directly. I discussed the case in full with the admitting physician. Differential Diagnosis: appendicitis, obstruction, perforated viscus, intestinal ischemia, AAA dissection, other    FINAL IMPRESSION:    1. Abdominal pain in female    2. Urinary tract infection in female        (Please note that I used voice recognition software to generate this note.   Occasionally words are mistranscribed despite my efforts to edit errors.)       Raul Slaughter MD  01/10/23 4474

## 2023-01-10 NOTE — PROGRESS NOTES
4 Eyes Skin Assessment     NAME:  Olivia Almanza  YOB: 1955  MEDICAL RECORD NUMBER:  [id number]    The patient is being assessed for  Admission    I agree that One RN have performed a thorough Head to Toe Skin Assessment on the patient. ALL assessment sites listed below have been assessed. Areas assessed by both nurses:    Head, Face, Ears, Shoulders, Back, Chest, Arms, Elbows, Hands, Sacrum. Buttock, Coccyx, Ischium, and Legs. Feet and Heels        Does the Patient have a Wound?  No noted wound(s)       Himanshu Prevention initiated by RN: Yes   Wound Care Orders initiated by RN: NA    Pressure Injury (Stage 3,4, Unstageable, DTI, NWPT, and Complex wounds) if present place referral order by RN under : NA    New and Established Ostomies, if present place, referral order under : NA      Nurse 1 eSignature: Electronically signed by Alverto Rowland RN on 1/10/23 at 6:19 AM EST    **SHARE this note so that the co-signing nurse is able to place an eSignature**    Nurse 2 eSignature: {Esignature:[id number]}

## 2023-01-10 NOTE — ED NOTES
Report given to [facility] RN. No questions or concerns at this time.      Lexie Liu RN  01/09/23 0331

## 2023-01-10 NOTE — CONSULTS
Gastroenterology Consult Note        Patient: Paras Varner  : 1955  Acct#:      Date:  1/10/2023    Subjective:       History of Present Illness  Patient is a 79 y.o.  female admitted with UTI (urinary tract infection) [N39.0] who is seen in consult for N/V. H/o DM, chronic back pain. She had been on vicodin (weaned off in Dec) but now on suboxone. S/p cholecystectomy. S/p hiatal hernia repair. She reports nausea and vomiting since 2022. This has worsened recently. Can occur in the morning and then no further N/V that day. May get a vague epigastric discomfort with vomiting. H/o constipation. Can go a week without a BM. Saw Dr Moise Munoz as new pt late Dec. Had AXR that was suggestive of constipation. Upper GI with SBFT was done which showed recurrent HH, reflux to lower thoracic esophagus, and was suggestive of esophageal dysmotility. Plan was for GES and EGD and colonoscopy. Occasional solid food and liquid dysphagia for a few months. 10 pound unintentional weight loss recently. No melena, hematochezia, hematemesis. Also taking edibles for N/V. Past Medical History:   Diagnosis Date    Anxiety     Diabetes mellitus ([address]     Hyperlipidemia     MVP (mitral valve prolapse)     Neck pain       Past Surgical History:   Procedure Laterality Date    CHOLECYSTECTOMY      HYSTERECTOMY (CERVIX STATUS UNKNOWN)        Past Endoscopic History:      Admission Meds  No current facility-administered medications on file prior to encounter. Current Outpatient Medications on File Prior to Encounter   Medication Sig Dispense Refill    sitaGLIPtan (JANUVIA) 100 MG tablet Take 100 mg by mouth daily. (Patient not taking: Reported on 2023)      HYDROcodone-acetaminophen (NORCO) 5-325 MG per tablet Take 1 tablet by mouth every 6 hours as needed. metformin (GLUCOPHAGE) 1000 MG tablet Take 1,000 mg by mouth 2 times daily (with meals).       glimepiride (AMARYL) 4 MG tablet Take 4 mg by mouth 2 times daily. (Patient not taking: Reported on 1/9/2023)      citalopram (CELEXA) 40 MG tablet Take 40 mg by mouth See Admin Instructions. 1 1/2 TABS DAILY      metoprolol (LOPRESSOR) 50 MG tablet Take 50 mg by mouth daily. estrogens conjugated, synthetic A, (CENESTIN) 1.25 MG tablet Take 1.25 mg by mouth daily. (Patient not taking: Reported on 1/9/2023)              Allergies  No Known Allergies   Social   Social History     Tobacco Use    Smoking status: Never    Smokeless tobacco: Not on file   Substance Use Topics    Alcohol use: No        History reviewed. No pertinent family history. Review of Systems  Pertinent items are noted in HPI. Physical Exam  Blood pressure (!) 148/71, pulse 63, temperature 99.4 °F (37.4 °C), temperature source Oral, resp. rate 18, height 5' 6\" (1.676 m), weight 240 lb (108.9 kg), SpO2 100 %. General appearance: alert, cooperative, no distress, appears stated age  Eyes: Anicteric  Head: Normocephalic, without obvious abnormality  Lungs: clear to auscultation bilaterally, Normal Effort  Heart: regular rate and rhythm, normal S1 and S2, no murmurs or rubs  Abdomen: soft, non-tender. Bowel sounds normal. No masses,  no organomegaly. Extremities: atraumatic, no cyanosis or edema  Skin: warm and dry, no jaundice  Neuro: Grossly intact, A&OX3  Musculoskeletal: 5/5  strength BUE      Data Review:    Recent Labs     01/09/23  1605   WBC 7.5   HGB 11.2*   HCT 34.6*   MCV 83.1        Recent Labs     01/09/23  1605 01/10/23  0521    143   K 4.2 3.9    106   CO2 24 24   BUN 14 11   CREATININE 1.0 0.9     Recent Labs     01/09/23  1605   AST 16   ALT 10   BILITOT 0.4   ALKPHOS 97     Recent Labs     01/09/23  1605   LIPASE 7.0*     No results for input(s): PROTIME, INR in the last 72 hours. No results for input(s): PTT in the last 72 hours. No results for input(s): OCCULTBLD in the last 72 hours.     Imaging Studies: CT-scan of abdomen and pelvis w iv contrast 1/9/23:  Impression   1. Moderate-sized hiatus hernia. 2. Negative for bowel obstruction. Assessment:     Nausea and vomiting - began in Sept 2022 and symptoms have been worsening lately. Upper GI showed recurrence of hiatal hernia. There was concern for esophageal dysmotility and she does report dysphagia as well. Imaging also c/w constipation. Will need EGD to eval upper GI tract. Can place NJ tube so can give bowel prep for catharsis. Will need GES at some point but do not think she will tolerate this currently. Constipation     Recommendations:   - PPI IV  - antiemetics  - Scoplamine patch  - movantik. If no improvement, can try linzess. - EGD with NJ tube placement tomorrow. Can give more laxatives such as bowel prep via NJ tube. - Will need GES at some point but do not think she will tolerate this currently. Discussed with Dr. Joseph Bass, [facility]  [address]    Patient was seen and examined with nurse practitioner, I was present for all parts of history and physical exam, I helped formulate and finalize plan of care. 80 yo patient with narcotic dependence has nausea, vomiting, constipation, but denies significant abdominal pain.   Has evidence of recurrent hiatal hernia on UGI study  Delayed liquid phase emptying ( 2 hr study), will need 4 hr GES    Plan for EGD in AM  Scop patch  Will discuss possible regaln- patient states she did not try this due to concern for drug interaction  Will try movantik or linzess for constipation

## 2023-01-11 ENCOUNTER — ANESTHESIA EVENT (OUTPATIENT)
Dept: ENDOSCOPY | Age: 68
End: 2023-01-11
Payer: MEDICARE

## 2023-01-11 ENCOUNTER — ANESTHESIA (OUTPATIENT)
Dept: ENDOSCOPY | Age: 68
End: 2023-01-11
Payer: MEDICARE

## 2023-01-11 LAB
GLUCOSE BLD-MCNC: 119 MG/DL (ref 70–99)
GLUCOSE BLD-MCNC: 134 MG/DL (ref 70–99)
GLUCOSE BLD-MCNC: 136 MG/DL (ref 70–99)
GLUCOSE BLD-MCNC: 157 MG/DL (ref 70–99)
GLUCOSE BLD-MCNC: 159 MG/DL (ref 70–99)
ORGANISM: ABNORMAL
PERFORMED ON: ABNORMAL
URINE CULTURE, ROUTINE: ABNORMAL

## 2023-01-11 PROCEDURE — 1200000000 HC SEMI PRIVATE

## 2023-01-11 PROCEDURE — 2580000003 HC RX 258: Performed by: INTERNAL MEDICINE

## 2023-01-11 PROCEDURE — 6370000000 HC RX 637 (ALT 250 FOR IP): Performed by: INTERNAL MEDICINE

## 2023-01-11 PROCEDURE — 3609012400 HC EGD TRANSORAL BIOPSY SINGLE/MULTIPLE: Performed by: INTERNAL MEDICINE

## 2023-01-11 PROCEDURE — 2709999900 HC NON-CHARGEABLE SUPPLY: Performed by: INTERNAL MEDICINE

## 2023-01-11 PROCEDURE — 2500000003 HC RX 250 WO HCPCS: Performed by: NURSE ANESTHETIST, CERTIFIED REGISTERED

## 2023-01-11 PROCEDURE — 3700000000 HC ANESTHESIA ATTENDED CARE: Performed by: INTERNAL MEDICINE

## 2023-01-11 PROCEDURE — 0DB68ZX EXCISION OF STOMACH, VIA NATURAL OR ARTIFICIAL OPENING ENDOSCOPIC, DIAGNOSTIC: ICD-10-PCS | Performed by: INTERNAL MEDICINE

## 2023-01-11 PROCEDURE — 88305 TISSUE EXAM BY PATHOLOGIST: CPT

## 2023-01-11 PROCEDURE — 6360000002 HC RX W HCPCS: Performed by: PHYSICIAN ASSISTANT

## 2023-01-11 PROCEDURE — 7100000001 HC PACU RECOVERY - ADDTL 15 MIN: Performed by: INTERNAL MEDICINE

## 2023-01-11 PROCEDURE — C9113 INJ PANTOPRAZOLE SODIUM, VIA: HCPCS | Performed by: PHYSICIAN ASSISTANT

## 2023-01-11 PROCEDURE — 2580000003 HC RX 258: Performed by: NURSE ANESTHETIST, CERTIFIED REGISTERED

## 2023-01-11 PROCEDURE — 7100000000 HC PACU RECOVERY - FIRST 15 MIN: Performed by: INTERNAL MEDICINE

## 2023-01-11 PROCEDURE — 2580000003 HC RX 258: Performed by: ANESTHESIOLOGY

## 2023-01-11 PROCEDURE — 6360000002 HC RX W HCPCS: Performed by: INTERNAL MEDICINE

## 2023-01-11 PROCEDURE — 6360000002 HC RX W HCPCS: Performed by: NURSE ANESTHETIST, CERTIFIED REGISTERED

## 2023-01-11 RX ORDER — PROMETHAZINE HYDROCHLORIDE 25 MG/1
25 SUPPOSITORY RECTAL EVERY 6 HOURS PRN
Status: DISCONTINUED | OUTPATIENT
Start: 2023-01-11 | End: 2023-01-13 | Stop reason: HOSPADM

## 2023-01-11 RX ORDER — PROMETHAZINE HYDROCHLORIDE 25 MG/1
25 TABLET ORAL EVERY 6 HOURS PRN
Status: DISCONTINUED | OUTPATIENT
Start: 2023-01-11 | End: 2023-01-13 | Stop reason: HOSPADM

## 2023-01-11 RX ORDER — SODIUM PHOSPHATE, DIBASIC AND SODIUM PHOSPHATE, MONOBASIC 7; 19 G/133ML; G/133ML
1 ENEMA RECTAL EVERY 12 HOURS
Status: DISPENSED | OUTPATIENT
Start: 2023-01-11 | End: 2023-01-12

## 2023-01-11 RX ORDER — PROPOFOL 10 MG/ML
INJECTION, EMULSION INTRAVENOUS CONTINUOUS PRN
Status: DISCONTINUED | OUTPATIENT
Start: 2023-01-11 | End: 2023-01-11 | Stop reason: SDUPTHER

## 2023-01-11 RX ORDER — SODIUM CHLORIDE 9 MG/ML
INJECTION, SOLUTION INTRAVENOUS CONTINUOUS
Status: DISCONTINUED | OUTPATIENT
Start: 2023-01-11 | End: 2023-01-12

## 2023-01-11 RX ORDER — LIDOCAINE HYDROCHLORIDE 20 MG/ML
INJECTION, SOLUTION EPIDURAL; INFILTRATION; INTRACAUDAL; PERINEURAL PRN
Status: DISCONTINUED | OUTPATIENT
Start: 2023-01-11 | End: 2023-01-11 | Stop reason: SDUPTHER

## 2023-01-11 RX ORDER — SODIUM CHLORIDE 9 MG/ML
INJECTION, SOLUTION INTRAVENOUS CONTINUOUS PRN
Status: DISCONTINUED | OUTPATIENT
Start: 2023-01-11 | End: 2023-01-11 | Stop reason: SDUPTHER

## 2023-01-11 RX ADMIN — CEFTRIAXONE SODIUM 1000 MG: 1 INJECTION, POWDER, FOR SOLUTION INTRAMUSCULAR; INTRAVENOUS at 22:43

## 2023-01-11 RX ADMIN — METOPROLOL TARTRATE 50 MG: 50 TABLET, FILM COATED ORAL at 08:11

## 2023-01-11 RX ADMIN — SODIUM CHLORIDE: 9 INJECTION, SOLUTION INTRAVENOUS at 11:08

## 2023-01-11 RX ADMIN — LIDOCAINE HYDROCHLORIDE 100 MG: 20 INJECTION, SOLUTION EPIDURAL; INFILTRATION; INTRACAUDAL; PERINEURAL at 11:13

## 2023-01-11 RX ADMIN — PANTOPRAZOLE SODIUM 40 MG: 40 INJECTION, POWDER, FOR SOLUTION INTRAVENOUS at 08:08

## 2023-01-11 RX ADMIN — BUPRENORPHINE AND NALOXONE 3 FILM: 2; .5 FILM BUCCAL; SUBLINGUAL at 08:11

## 2023-01-11 RX ADMIN — ONDANSETRON 4 MG: 2 INJECTION INTRAMUSCULAR; INTRAVENOUS at 17:44

## 2023-01-11 RX ADMIN — SODIUM CHLORIDE: 9 INJECTION, SOLUTION INTRAVENOUS at 09:53

## 2023-01-11 RX ADMIN — ENOXAPARIN SODIUM 30 MG: 100 INJECTION SUBCUTANEOUS at 20:37

## 2023-01-11 RX ADMIN — SODIUM PHOSPHATE, DIBASIC AND SODIUM PHOSPHATE, MONOBASIC 1 ENEMA: 7; 19 ENEMA RECTAL at 12:55

## 2023-01-11 RX ADMIN — PROPOFOL 120 MCG/KG/MIN: 10 INJECTION, EMULSION INTRAVENOUS at 11:13

## 2023-01-11 ASSESSMENT — PAIN - FUNCTIONAL ASSESSMENT: PAIN_FUNCTIONAL_ASSESSMENT: NONE - DENIES PAIN

## 2023-01-11 ASSESSMENT — LIFESTYLE VARIABLES: SMOKING_STATUS: 0

## 2023-01-11 ASSESSMENT — PAIN SCALES - GENERAL: PAINLEVEL_OUTOF10: 5

## 2023-01-11 NOTE — PROGRESS NOTES
[facility] PROGRESS NOTE    1/11/2023 4:36 PM        Name: Shyam Cruz . Admitted: 1/9/2023  Primary Care Provider: Son Rebolledo (Tel: [phone])      Subjective:  . Patient seen this am.Feeling better today. No vomiting since yesterday. Going for EGD, has loose BM.          Reviewed interval ancillary notes    Current Medications  0.9 % sodium chloride infusion, Continuous  promethazine (PHENERGAN) suppository 25 mg, Q6H PRN  promethazine (PHENERGAN) tablet 25 mg, Q6H PRN  sodium phosphate (FLEET) rectal enema 1 enema, Q12H  promethazine (PHENERGAN) 6.25 mg in sodium chloride 0.9 % 50 mL IVPB, Q6H PRN  naloxegol (MOVANTIK) tablet 25 mg, QAM AC  scopolamine (TRANSDERM-SCOP) transdermal patch 1 patch, Q72H  pantoprazole (PROTONIX) injection 40 mg, Daily  buprenorphine-naloxone (SUBOXONE) 2-0.5 MG SL film 3 Film, Daily  dextrose bolus 10% 125 mL, PRN   Or  dextrose bolus 10% 250 mL, PRN  glucagon (rDNA) injection 1 mg, PRN  dextrose 10 % infusion, Continuous PRN  cefTRIAXone (ROCEPHIN) 1,000 mg in dextrose 5 % 50 mL IVPB mini-bag, Q24H  insulin lispro (HUMALOG) injection vial 0-8 Units, TID WC  insulin lispro (HUMALOG) injection vial 0-4 Units, Nightly  sodium chloride flush 0.9 % injection 5-40 mL, 2 times per day  sodium chloride flush 0.9 % injection 5-40 mL, PRN  0.9 % sodium chloride infusion, PRN  enoxaparin Sodium (LOVENOX) injection 30 mg, BID  ondansetron (ZOFRAN-ODT) disintegrating tablet 4 mg, Q8H PRN   Or  ondansetron (ZOFRAN) injection 4 mg, Q6H PRN  polyethylene glycol (GLYCOLAX) packet 17 g, Daily PRN  acetaminophen (TYLENOL) tablet 650 mg, Q6H PRN   Or  acetaminophen (TYLENOL) suppository 650 mg, Q6H PRN  lactated ringers infusion, Continuous      Objective:  BP (!) 159/73   Pulse 52   Temp 98.7 °F (37.1 °C) (Oral)   Resp 16   Ht 5' 6\" (1.676 m)   Wt 240 lb (108.9 kg)   SpO2 96% BMI 38.74 kg/m²     Intake/Output Summary (Last 24 hours) at 1/11/2023 1636  Last data filed at 1/11/2023 1140  Gross per 24 hour   Intake 625 ml   Output --   Net 625 ml        Wt Readings from Last 3 Encounters:   01/09/23 240 lb (108.9 kg)       General appearance:  Appears comfortable after phenergan  Eyes: Sclera clear. Pupils equal.  ENT: Moist oral mucosa. Trachea midline, no adenopathy. Cardiovascular: Regular rhythm, normal S1, S2. No murmur. No edema in lower extremities  Respiratory: Not using accessory muscles. Good inspiratory effort. Clear to auscultation bilaterally, no wheeze or crackles. GI: Abdomen soft, no tenderness, not distended, normal bowel sounds  Musculoskeletal: No cyanosis in digits, neck supple  Neurology: CN 2-12 grossly intact. No speech or motor deficits  Psych: Normal affect. Alert and oriented in time, place and person  Skin: Warm, dry, normal turgor    Labs and Tests:  CBC:   Recent Labs     01/09/23  1605   WBC 7.5   HGB 11.2*          BMP:    Recent Labs     01/09/23  1605 01/10/23  0521    143   K 4.2 3.9    106   CO2 24 24   BUN 14 11   CREATININE 1.0 0.9   GLUCOSE 179* 121*       Hepatic:   Recent Labs     01/09/23  1605   AST 16   ALT 10   BILITOT 0.4   ALKPHOS 97       C abd/pelvis  Impression:     1. Moderate-sized hiatus hernia. 2. Negative for bowel obstruction. Problem List  Principal Problem:    UTI (urinary tract infection)  Resolved Problems:    * No resolved hospital problems. *       Assessment & Plan:   UTI-cultures positive for ecoli. Continue rocephin for now. Intractable nausea and vomiting-CT unremarkable. EGD today  Constipation-going to get enema  Dm  2-on amaryl and januvia at home. SSI for now      Diet: ADULT DIET;  Full Liquid; 4 carb choices (60 gm/meal)  Code:Full Code        Shane Rojo PA-C   1/11/2023 4:36 PM

## 2023-01-11 NOTE — ANESTHESIA POSTPROCEDURE EVALUATION
Department of Anesthesiology  Postprocedure Note    Patient: Francisco Ledesma  MRN: [id number]  YOB: 1955  Date of evaluation: 1/11/2023      Procedure Summary     Date: 01/11/23 Room / Location: [address] [place] [address]    Anesthesia Start: 1108 Anesthesia Stop: 4247    Procedure: EGD BIOPSY (Abdomen) Diagnosis:       Nausea and vomiting, unspecified vomiting type      (Nausea and vomiting, unspecified vomiting type [R11.2])    Surgeons: Ace Brown MD Responsible Provider: Indira Cox MD    Anesthesia Type: General ASA Status: 3          Anesthesia Type: General    Sue Phase I: Sue Score: 10    Sue Phase II:        Anesthesia Post Evaluation    Patient location during evaluation: PACU  Patient participation: complete - patient participated  Level of consciousness: awake and alert  Airway patency: patent  Nausea & Vomiting: no vomiting and no nausea  Complications: no  Cardiovascular status: hemodynamically stable  Respiratory status: acceptable  Hydration status: stable

## 2023-01-11 NOTE — PROGRESS NOTES
Reviewed patient's medical and surgical history in electronic record and with patient at the bedside. All questions regarding procedure answered. Scope number and equipment verified using a two person system. Family in waiting room.     Electronically signed by Elham Fermin RN on 1/11/2023 at 11:10 AM

## 2023-01-11 NOTE — PROCEDURES
Endoscopy Note    Patient: Leary Mcardle  :   CSN: [id number]    Procedure: Esophagogastroduodenoscopy with biopsy    Date:  2023     Surgeon:  Milian MD     Referring Physician:  Ana THOMSON    Preoperative Diagnosis:  Nausea and vomiting, unspecified vomiting type [R11.2]    Postoperative Diagnosis: The GE junction was located at 40 cm, no erosive esophagitis or Dwyer's esophagus noted. Normal caliber esophagus, no esophageal strictures noted. Linear antral gastritis, no gastric erosions or gastric ulcer seen. Patent pylorus. Random gastric biopsies obtained to rule out H. pylori. Normal duodenum    Anesthesia:  MAC    EBL: minimal to none. Indications: This is a 79y.o. year old female who presents today with  nausea, vomiting, was unable to keep anything down, being treated for UTI, feels better, also much improved with scopolamine patch. Prefers to defer/ avoid NJ placement . Description of Procedure:  Informed consent was obtained from the patient after explanation of indications, benefits and possible risks and complications of the procedure. The patient was then taken to the endoscopy suite, placed in the left lateral decubitus position and the above IV sedation was administrered. The Olympus video endoscope was passed through the hypopharynx into the esophagus. The scope was advanced all the way to the second portion of the duodenum. The GE junction was at approximately 40 cms. The scope was slowly withdrawn and mucosa was carefully evaluated including a retroflex view of the proximal stomach. Findings and interventions are described below. The patient was decompressed and the scope was then withdrawn. Gastric or Duodenal ulcer present: No    The patient tolerated the procedure well and was taken to the post anesthesia care unit in good condition. There were no immediate complications.       Impression:  -    - The GE junction was located at 40 cm, no erosive esophagitis or Dwyer's esophagus noted. - Normal caliber esophagus, no esophageal strictures noted. Linear antral gastritis, no gastric erosions or gastric ulcer seen. - Patent pylorus. Random gastric biopsies obtained to rule out H. pylori.  - Normal duodenum    Recommendations: -Acid suppression with a proton pump inhibitor. , -Await pathology. - Patient much improved today after scopolamine patch and iv aBX FOR UTI. -Continue scopolamine patch, recommend switching IV Phenergan to Phenergan rectal suppository and oral Phenergan. -Advance diet  - will discuss reglan with pharmacy ( for drug interaction)  Lucas MD, MD  [facility]  [phone]    Please note that some or all of this record was generated using voice recognition software. If there are any questions about the content of this document, please contact the author as some errors in translation may have occurred.

## 2023-01-11 NOTE — PLAN OF CARE
Problem: Discharge Planning  Goal: Discharge to home or other facility with appropriate resources  1/11/2023 1054 by Shima Hernandez RN  Outcome: Progressing  1/11/2023 0034 by Dorothy Jennings RN  Outcome: Progressing     Problem: Safety - Adult  Goal: Free from fall injury  1/11/2023 1054 by Shima Hernandez RN  Outcome: Progressing  1/11/2023 0034 by Dorothy Jennings RN  Outcome: Progressing     Problem: Chronic Conditions and Co-morbidities  Goal: Patient's chronic conditions and co-morbidity symptoms are monitored and maintained or improved  1/11/2023 1054 by Shima Hernandez RN  Outcome: Progressing  1/11/2023 0034 by Dorothy Jennings RN  Outcome: Progressing

## 2023-01-11 NOTE — PROGRESS NOTES
This PCA rounded on patient and took vitals. Patient remains in bed and free of falls. Patient denies all needs at this time. Bed left in lowest position with call light in reach.

## 2023-01-11 NOTE — PROGRESS NOTES
Shift assessment complete, VSS, A&Ox4. Lovenox held this morning due to EGD procedure. All other morning medications administered. Patient states no further needs at this time. Call light and personal belongings within reach.

## 2023-01-11 NOTE — ANESTHESIA PRE PROCEDURE
Department of Anesthesiology  Preprocedure Note       Name:  Cary Casas   Age:  79 y.o.  :  1955                                          MRN:  [id number]         Date:  2023      Surgeon: China Verdugo):  Lisa Porter MD    Procedure: Procedure(s):  EGD DIAGNOSTIC ONLY    Medications prior to admission:   Prior to Admission medications    Medication Sig Start Date End Date Taking? Authorizing Provider   sitaGLIPtan (JANUVIA) 100 MG tablet Take 100 mg by mouth daily. Patient not taking: Reported on 2023    Historical Provider, MD   HYDROcodone-acetaminophen (NORCO) 5-325 MG per tablet Take 1 tablet by mouth every 6 hours as needed. Historical Provider, MD   metformin (GLUCOPHAGE) 1000 MG tablet Take 1,000 mg by mouth 2 times daily (with meals). Historical Provider, MD   glimepiride (AMARYL) 4 MG tablet Take 4 mg by mouth 2 times daily. Patient not taking: Reported on 2023    Historical Provider, MD   citalopram (CELEXA) 40 MG tablet Take 40 mg by mouth See Admin Instructions. 1 1/2 TABS DAILY    Historical Provider, MD   metoprolol (LOPRESSOR) 50 MG tablet Take 50 mg by mouth daily. Historical Provider, MD   estrogens conjugated, synthetic A, (CENESTIN) 1.25 MG tablet Take 1.25 mg by mouth daily.   Patient not taking: Reported on 2023    Historical Provider, MD       Current medications:    Current Facility-Administered Medications   Medication Dose Route Frequency Provider Last Rate Last Admin    promethazine (PHENERGAN) 6.25 mg in sodium chloride 0.9 % 50 mL IVPB   IntraVENous Q6H PRN Francesco Aguilera MD   New Bag at 01/10/23 1111    naloxegol (MOVANTIK) tablet 25 mg  25 mg Oral QAM AC Deanna Samuel PA-C        scopolamine (TRANSDERM-SCOP) transdermal patch 1 patch  1 patch TransDERmal G18W Patricia Mclaughlin PA-C   1 patch at 01/10/23 1335    pantoprazole (PROTONIX) injection 40 mg  40 mg IntraVENous Daily Patricia Mclaughlin PA-C   40 mg at 01/10/23 1335    buprenorphine-naloxone (SUBOXONE) 2-0.5 MG SL film 3 Film  3 Film SubLINGual Daily Sugar Poon PA-C   3 Film at 01/11/23 0811   • dextrose bolus 10% 125 mL  125 mL IntraVENous PRN Linda Gregory MD        Or   • dextrose bolus 10% 250 mL  250 mL IntraVENous PRN Linda Gregory MD       • glucagon (rDNA) injection 1 mg  1 mg SubCUTAneous PRN Linda Gregory MD       • dextrose 10 % infusion   IntraVENous Continuous PRN Linda Gregory MD       • metoprolol tartrate (LOPRESSOR) tablet 50 mg  50 mg Oral Daily Isabella Lloyd MD   50 mg at 01/11/23 0811   • cefTRIAXone (ROCEPHIN) 1,000 mg in dextrose 5 % 50 mL IVPB mini-bag  1,000 mg IntraVENous Q24H Isabella Lloyd MD   Stopped at 01/10/23 2323   • insulin lispro (HUMALOG) injection vial 0-8 Units  0-8 Units SubCUTAneous TID WC Isabella Lloyd MD       • insulin lispro (HUMALOG) injection vial 0-4 Units  0-4 Units SubCUTAneous Nightly Isabella Lloyd MD       • sodium chloride flush 0.9 % injection 5-40 mL  5-40 mL IntraVENous 2 times per day Isabella Lloyd MD   10 mL at 01/10/23 2055   • sodium chloride flush 0.9 % injection 5-40 mL  5-40 mL IntraVENous PRN Isabella Lloyd MD       • 0.9 % sodium chloride infusion   IntraVENous PRN Isabella Lloyd MD       • enoxaparin Sodium (LOVENOX) injection 30 mg  30 mg SubCUTAneous BID Isabella Lloyd MD   30 mg at 01/10/23 2231   • ondansetron (ZOFRAN-ODT) disintegrating tablet 4 mg  4 mg Oral Q8H PRN Isablela Lloyd MD        Or   • ondansetron (ZOFRAN) injection 4 mg  4 mg IntraVENous Q6H PRN Isabella Lloyd MD   4 mg at 01/10/23 1024   • polyethylene glycol (GLYCOLAX) packet 17 g  17 g Oral Daily PRN Isabella Lloyd MD       • acetaminophen (TYLENOL) tablet 650 mg  650 mg Oral Q6H PRN Isabella Lloyd MD        Or   • acetaminophen (TYLENOL) suppository 650 mg  650 mg Rectal Q6H PRN Isabella Lloyd MD       • lactated ringers infusion   IntraVENous Continuous Isabella Lloyd  mL/hr at 01/09/23  [address] at 01/09/23 2241       Allergies:  No Known Allergies    Problem List:    Patient Active Problem List   Diagnosis Code    UTI (urinary tract infection) N39.0       Past Medical History:        Diagnosis Date    Anxiety     Diabetes mellitus ([facility] [address]     Hyperlipidemia     MVP (mitral valve prolapse)     Neck pain        Past Surgical History:        Procedure Laterality Date    CHOLECYSTECTOMY      HYSTERECTOMY (CERVIX STATUS UNKNOWN)         Social History:    Social History     Tobacco Use    Smoking status: Never    Smokeless tobacco: Not on file   Substance Use Topics    Alcohol use: No                                Counseling given: Not Answered      Vital Signs (Current):   Vitals:    01/10/23 2045 01/10/23 2344 01/11/23 0630 01/11/23 0800   BP: (!) 147/81 (!) 144/78 (!) 147/76 (!) 146/79   Pulse: 82 79 80 81   Resp: 18 18  18   Temp: 98.9 °F (37.2 °C) 98.8 °F (37.1 °C)  98.4 °F (36.9 °C)   TempSrc: Oral Oral  Oral   SpO2: 95% 98%  98%   Weight:       Height:                                                  BP Readings from Last 3 Encounters:   01/11/23 (!) 146/79       NPO Status:                                                                                 BMI:   Wt Readings from Last 3 Encounters:   01/09/23 240 lb (108.9 kg)     Body mass index is 38.74 kg/m².     CBC:   Lab Results   Component Value Date/Time    WBC 7.5 01/09/2023 04:05 PM    RBC 4.17 01/09/2023 04:05 PM    HGB 11.2 01/09/2023 04:05 PM    HCT 34.6 01/09/2023 04:05 PM    MCV 83.1 01/09/2023 04:05 PM    RDW 14.0 01/09/2023 04:05 PM     01/09/2023 04:05 PM       CMP:   Lab Results   Component Value Date/Time     01/10/2023 05:21 AM    K 3.9 01/10/2023 05:21 AM     01/10/2023 05:21 AM    CO2 24 01/10/2023 05:21 AM    BUN 11 01/10/2023 05:21 AM    CREATININE 0.9 01/10/2023 05:21 AM    AGRATIO 1.0 01/09/2023 04:05 PM    LABGLOM >60 01/10/2023 05:21 AM    GLUCOSE 121 01/10/2023 05:21 AM    PROT 6.9 01/09/2023 04:05 PM    CALCIUM 9.2 01/10/2023 05:21 AM    BILITOT 0.4 01/09/2023 04:05 PM    ALKPHOS 97 01/09/2023 04:05 PM    AST 16 01/09/2023 04:05 PM    ALT 10 01/09/2023 04:05 PM       POC Tests:   Recent Labs     01/11/23  0730   POCGLU 119*       Coags: No results found for: PROTIME, INR, APTT    HCG (If Applicable): No results found for: PREGTESTUR, PREGSERUM, HCG, HCGQUANT     ABGs: No results found for: PHART, PO2ART, WHU3JPX, APQ7EKI, BEART, [id number]     Type & Screen (If Applicable):  No results found for: LABABO, LABRH    Drug/Infectious Status (If Applicable):  No results found for: HIV, HEPCAB    COVID-19 Screening (If Applicable): No results found for: COVID19        Anesthesia Evaluation  Patient summary reviewed and Nursing notes reviewed no history of anesthetic complications:   Airway: Mallampati: III  TM distance: >3 FB   Neck ROM: full  Mouth opening: > = 3 FB   Dental:    (+) edentulous      Pulmonary:Negative Pulmonary ROS and normal exam  breath sounds clear to auscultation      (-) COPD, asthma, sleep apnea and not a current smoker                           Cardiovascular:    (+) hypertension:,     (-) past MI, CAD (neg cath 6/22), CABG/stent, dysrhythmias,  angina and  CHF    ECG reviewed  Rhythm: regular  Rate: normal           Beta Blocker:  Dose within 24 Hrs         Neuro/Psych:   (+) depression/anxiety    (-) seizures, TIA and CVA           GI/Hepatic/Renal:   (+) GERD:,      (-) liver disease and no renal disease       Endo/Other:    (+) Diabetes (a1c 8 per pt)Type II DM, , .    (-) hypothyroidism, hyperthyroidism               Abdominal:   (+) obese,           Vascular: Other Findings:           Anesthesia Plan      MAC     ASA 3       Induction: intravenous. Anesthetic plan and risks discussed with patient. Plan discussed with CRNA.                     Nadya Soto MD   1/11/2023

## 2023-01-11 NOTE — H&P
Gastroenterology Note             Pre-operative History and Physical    Patient: Abel Fischer  : [birth year]  CSN: [id number]    History Obtained From:  patient and/or guardian. HISTORY OF PRESENT ILLNESS:    The patient is a 79 y.o. female  here for nausea, vomiting. Past Medical History:    Past Medical History:   Diagnosis Date    Anxiety     Diabetes mellitus ([address]     Hyperlipidemia     MVP (mitral valve prolapse)     Neck pain      Past Surgical History:    Past Surgical History:   Procedure Laterality Date    CHOLECYSTECTOMY      HYSTERECTOMY (CERVIX STATUS UNKNOWN)       Medications Prior to Admission:   No current facility-administered medications on file prior to encounter. Current Outpatient Medications on File Prior to Encounter   Medication Sig Dispense Refill    sitaGLIPtan (JANUVIA) 100 MG tablet Take 100 mg by mouth daily. (Patient not taking: Reported on 2023)      HYDROcodone-acetaminophen (NORCO) 5-325 MG per tablet Take 1 tablet by mouth every 6 hours as needed. metformin (GLUCOPHAGE) 1000 MG tablet Take 1,000 mg by mouth 2 times daily (with meals). glimepiride (AMARYL) 4 MG tablet Take 4 mg by mouth 2 times daily. (Patient not taking: Reported on 2023)      citalopram (CELEXA) 40 MG tablet Take 40 mg by mouth See Admin Instructions. 1 1/2 TABS DAILY      metoprolol (LOPRESSOR) 50 MG tablet Take 50 mg by mouth daily. estrogens conjugated, synthetic A, (CENESTIN) 1.25 MG tablet Take 1.25 mg by mouth daily. (Patient not taking: Reported on 2023)          Allergies:  Patient has no known allergies. Social History:   Social History     Tobacco Use    Smoking status: Never    Smokeless tobacco: Not on file   Substance Use Topics    Alcohol use: No     Family History:   History reviewed. No pertinent family history.     PHYSICAL EXAM:      BP (!) 152/72   Pulse 52   Temp (!) 96 °F (35.6 °C) (Temporal)   Resp 16   Ht 5' 6\" (1.676 m)   Wt 240 lb (108.9 kg)   SpO2 96%   BMI 38.74 kg/m²  I        Heart:   within normal limits    Lungs:  CTA bilat anteriorly,  Normal effort    Abdomen:   soft, NT, ND      ASA Grade:  ASA 2 - Patient with mild systemic disease with no functional limitations    Mallampati Class: 2      ASSESSMENT AND PLAN:    1. Patient is a 79 y.o. female here for EGD  2. Procedure options, risks and benefits reviewed with patient. Patient expresses understanding and wishes to proceed. Vences MD,   [address]   1/11/2023    Please note that some or all of this record was generated using voice recognition software. If there are any questions about the content of this document, please contact the author as some errors in translation may have occurred.

## 2023-01-11 NOTE — PROGRESS NOTES
This PCA rounded on patient. Patient denies any needs at this time. Bed left in lower position with table and call light within in reach.

## 2023-01-11 NOTE — PROGRESS NOTES
PT received into [facility] from Endo. Pt drowsy, yet arousable. Vss. Pt stable. Report obtained. Denies any pain. Will monitor.

## 2023-01-12 ENCOUNTER — APPOINTMENT (OUTPATIENT)
Dept: GENERAL RADIOLOGY | Age: 68
End: 2023-01-12
Payer: MEDICARE

## 2023-01-12 LAB
EKG ATRIAL RATE: 59 BPM
EKG DIAGNOSIS: NORMAL
EKG P AXIS: 46 DEGREES
EKG P-R INTERVAL: 136 MS
EKG Q-T INTERVAL: 440 MS
EKG QRS DURATION: 94 MS
EKG QTC CALCULATION (BAZETT): 436 MS
EKG R AXIS: -37 DEGREES
EKG T AXIS: 31 DEGREES
EKG VENTRICULAR RATE: 59 BPM
GLUCOSE BLD-MCNC: 162 MG/DL (ref 70–99)
GLUCOSE BLD-MCNC: 175 MG/DL (ref 70–99)
GLUCOSE BLD-MCNC: 187 MG/DL (ref 70–99)
GLUCOSE BLD-MCNC: 191 MG/DL (ref 70–99)
PERFORMED ON: ABNORMAL

## 2023-01-12 PROCEDURE — 74018 RADEX ABDOMEN 1 VIEW: CPT

## 2023-01-12 PROCEDURE — 6370000000 HC RX 637 (ALT 250 FOR IP): Performed by: PHYSICIAN ASSISTANT

## 2023-01-12 PROCEDURE — 6360000002 HC RX W HCPCS: Performed by: PHYSICIAN ASSISTANT

## 2023-01-12 PROCEDURE — 93005 ELECTROCARDIOGRAM TRACING: CPT | Performed by: PHYSICIAN ASSISTANT

## 2023-01-12 PROCEDURE — 6360000002 HC RX W HCPCS: Performed by: INTERNAL MEDICINE

## 2023-01-12 PROCEDURE — 6370000000 HC RX 637 (ALT 250 FOR IP): Performed by: INTERNAL MEDICINE

## 2023-01-12 PROCEDURE — 1200000000 HC SEMI PRIVATE

## 2023-01-12 PROCEDURE — 93010 ELECTROCARDIOGRAM REPORT: CPT | Performed by: INTERNAL MEDICINE

## 2023-01-12 PROCEDURE — C9113 INJ PANTOPRAZOLE SODIUM, VIA: HCPCS | Performed by: PHYSICIAN ASSISTANT

## 2023-01-12 PROCEDURE — 2580000003 HC RX 258: Performed by: INTERNAL MEDICINE

## 2023-01-12 RX ORDER — LISINOPRIL 20 MG/1
20 TABLET ORAL DAILY
COMMUNITY
Start: 2022-12-07

## 2023-01-12 RX ORDER — PREGABALIN 75 MG/1
75 CAPSULE ORAL 2 TIMES DAILY
COMMUNITY
Start: 2022-11-03

## 2023-01-12 RX ORDER — ONDANSETRON 4 MG/1
4 TABLET, ORALLY DISINTEGRATING ORAL EVERY 8 HOURS PRN
COMMUNITY
Start: 2022-12-20

## 2023-01-12 RX ORDER — RANOLAZINE 500 MG/1
500 TABLET, EXTENDED RELEASE ORAL 2 TIMES DAILY
Status: DISCONTINUED | OUTPATIENT
Start: 2023-01-12 | End: 2023-01-13 | Stop reason: HOSPADM

## 2023-01-12 RX ORDER — LISINOPRIL 20 MG/1
20 TABLET ORAL DAILY
Status: DISCONTINUED | OUTPATIENT
Start: 2023-01-12 | End: 2023-01-13 | Stop reason: HOSPADM

## 2023-01-12 RX ORDER — RANOLAZINE 500 MG/1
500 TABLET, EXTENDED RELEASE ORAL 2 TIMES DAILY
COMMUNITY
Start: 2022-12-29

## 2023-01-12 RX ORDER — BUPRENORPHINE AND NALOXONE 4; 1 MG/1; MG/1
1.5 FILM, SOLUBLE BUCCAL; SUBLINGUAL DAILY
COMMUNITY

## 2023-01-12 RX ORDER — METOCLOPRAMIDE 10 MG/1
10 TABLET ORAL
Status: DISCONTINUED | OUTPATIENT
Start: 2023-01-12 | End: 2023-01-13 | Stop reason: HOSPADM

## 2023-01-12 RX ORDER — VENLAFAXINE HYDROCHLORIDE 150 MG/1
150 CAPSULE, EXTENDED RELEASE ORAL
Status: DISCONTINUED | OUTPATIENT
Start: 2023-01-13 | End: 2023-01-13 | Stop reason: HOSPADM

## 2023-01-12 RX ORDER — INSULIN ASPART 100 [IU]/ML
6-40 INJECTION, SOLUTION INTRAVENOUS; SUBCUTANEOUS
COMMUNITY
Start: 2022-12-08

## 2023-01-12 RX ORDER — VENLAFAXINE HYDROCHLORIDE 150 MG/1
150 CAPSULE, EXTENDED RELEASE ORAL DAILY
COMMUNITY
Start: 2022-05-19

## 2023-01-12 RX ORDER — ATORVASTATIN CALCIUM 10 MG/1
10 TABLET, FILM COATED ORAL DAILY
Status: DISCONTINUED | OUTPATIENT
Start: 2023-01-12 | End: 2023-01-13 | Stop reason: HOSPADM

## 2023-01-12 RX ORDER — SIMVASTATIN 20 MG
20 TABLET ORAL NIGHTLY
COMMUNITY
Start: 2022-12-07

## 2023-01-12 RX ORDER — INSULIN DEGLUDEC 200 U/ML
110 INJECTION, SOLUTION SUBCUTANEOUS NIGHTLY
COMMUNITY
Start: 2023-01-06

## 2023-01-12 RX ORDER — PREGABALIN 75 MG/1
75 CAPSULE ORAL 2 TIMES DAILY
Status: DISCONTINUED | OUTPATIENT
Start: 2023-01-12 | End: 2023-01-13 | Stop reason: HOSPADM

## 2023-01-12 RX ADMIN — BUPRENORPHINE AND NALOXONE 3 FILM: 2; .5 FILM BUCCAL; SUBLINGUAL at 12:01

## 2023-01-12 RX ADMIN — PREGABALIN 75 MG: 75 CAPSULE ORAL at 14:01

## 2023-01-12 RX ADMIN — ATORVASTATIN CALCIUM 10 MG: 10 TABLET, FILM COATED ORAL at 14:01

## 2023-01-12 RX ADMIN — Medication 10 ML: at 20:05

## 2023-01-12 RX ADMIN — METOCLOPRAMIDE 10 MG: 10 TABLET ORAL at 20:04

## 2023-01-12 RX ADMIN — PROMETHAZINE HYDROCHLORIDE 25 MG: 25 TABLET ORAL at 10:49

## 2023-01-12 RX ADMIN — METOCLOPRAMIDE 10 MG: 10 TABLET ORAL at 10:49

## 2023-01-12 RX ADMIN — LISINOPRIL 20 MG: 20 TABLET ORAL at 14:00

## 2023-01-12 RX ADMIN — NALOXEGOL OXALATE 25 MG: 25 TABLET, FILM COATED ORAL at 06:02

## 2023-01-12 RX ADMIN — ENOXAPARIN SODIUM 30 MG: 100 INJECTION SUBCUTANEOUS at 20:04

## 2023-01-12 RX ADMIN — PREGABALIN 75 MG: 75 CAPSULE ORAL at 20:03

## 2023-01-12 RX ADMIN — PANTOPRAZOLE SODIUM 40 MG: 40 INJECTION, POWDER, FOR SOLUTION INTRAVENOUS at 10:50

## 2023-01-12 RX ADMIN — RANOLAZINE 500 MG: 500 TABLET, EXTENDED RELEASE ORAL at 14:02

## 2023-01-12 RX ADMIN — Medication 10 ML: at 10:50

## 2023-01-12 RX ADMIN — ENOXAPARIN SODIUM 30 MG: 100 INJECTION SUBCUTANEOUS at 10:51

## 2023-01-12 RX ADMIN — ONDANSETRON 4 MG: 2 INJECTION INTRAMUSCULAR; INTRAVENOUS at 00:48

## 2023-01-12 RX ADMIN — RANOLAZINE 500 MG: 500 TABLET, EXTENDED RELEASE ORAL at 20:04

## 2023-01-12 RX ADMIN — METOCLOPRAMIDE 10 MG: 10 TABLET ORAL at 17:29

## 2023-01-12 ASSESSMENT — PAIN SCALES - GENERAL: PAINLEVEL_OUTOF10: 6

## 2023-01-12 ASSESSMENT — PAIN DESCRIPTION - LOCATION: LOCATION: BACK

## 2023-01-12 NOTE — PROGRESS NOTES
Nutrition Note    RECOMMENDATIONS  PO Diet: Full liquids, CCC  ONS: Glucerna BID  Order for staff to obtain [address] Loc[facility] within [facility]   Pt receives a full liquids CCC diet with poor po intake d/t N/V. Pt states symptoms have been on & off since 9/22. States has lsot ~10-12 lb as a result. CBW is stated @ 240 lb with wt hx to verify for wt loss; pt unsure of UBW. Pt agrees to try Glucerna supplements BID to help promote adequate po intake. Will monitor for acceptance & tolerance. No further nutrition concerns expressed @ this time. Nutrition Related Findings: Gluc 191; LBM  1/12; nonpitting edema BLE; LR @ 100 ml/hr  Wounds: None  Nutrition Education:  Education not indicated   Nutrition Goals: PO intake 50% or greater     MALNUTRITION ASSESSMENT   Acute Illness  Malnutrition Status: At risk for malnutrition (Comment)  Findings of the 6 clinical characteristics of malnutrition:  Energy Intake:  75% or less of estimated energy requirements for 7 or more days  Weight Loss:  Unable to assess     Body Fat Loss:  No significant body fat loss     Muscle Mass Loss:  No significant muscle mass loss    Fluid Accumulation:  Mild Extremities   Strength:  Not Performed      NUTRITION DIAGNOSIS   Inadequate oral intake related to altered GI function as evidenced by intake 0-25%, poor intake prior to admission, weight loss, vomiting, nausea      CURRENT NUTRITION THERAPIES  ADULT DIET; Full Liquid; 4 carb choices (60 gm/meal)  ADULT ORAL NUTRITION SUPPLEMENT; Breakfast, Dinner; Diabetic Oral Supplement     PO Intake: 1-25%   PO Supplement Intake:None Ordered    ANTHROPOMETRICS  Current Height: 5' 6\" (167.6 cm)  Current Weight: 240 lb (108.9 kg)    Ideal Body Weight (IBW): 130 lbs  (59 kg)        BMI: 38.8      COMPARATIVE STANDARDS  Energy (kcal):  1635- 1962     Protein (g):  71- 118g       Fluid (mL/day):  1 ml/kcal    The patient will be monitored per nutrition standards of care.  Consult dietitian if additional nutrition interventions are needed prior to RD reassessment.      Mariam Hankins RD, LD    Contact: [phone]

## 2023-01-12 NOTE — PLAN OF CARE
Problem: Discharge Planning  Goal: Discharge to home or other facility with appropriate resources  1/11/2023 2034 by Karma Guerra RN  Outcome: Progressing  1/11/2023 1054 by Red Chaudhari RN  Outcome: Progressing     Problem: Safety - Adult  Goal: Free from fall injury  1/11/2023 2034 by Karma Guerra RN  Outcome: Progressing  1/11/2023 1054 by Red Chaudhari RN  Outcome: Progressing     Problem: Chronic Conditions and Co-morbidities  Goal: Patient's chronic conditions and co-morbidity symptoms are monitored and maintained or improved  1/11/2023 2034 by Karma Guerra RN  Outcome: Progressing  1/11/2023 1054 by Red Chaudhari RN  Outcome: Progressing

## 2023-01-12 NOTE — PROGRESS NOTES
Gastroenterology Progress Note      Bina Shi is a 79 y.o. female patient. 1. Abdominal pain in female    2. Urinary tract infection in female    3. Nausea and vomiting, unspecified vomiting type        SUBJECTIVE:  Pt had nausea and vomiting with clear liquids today. No abdominal pain. Had a loose stool yesterday and another one today. ROS:  Cardiovascular ROS: no chest pain or dyspnea on exertion  Gastrointestinal ROS: see hpi  Respiratory ROS: no cough, shortness of breath, or wheezing    Physical    VITALS:  BP (!) 171/110   Pulse 71   Temp 98.6 °F (37 °C) (Oral)   Resp 16   Ht 5' 6\" (1.676 m)   Wt 240 lb (108.9 kg)   SpO2 97%   BMI 38.74 kg/m²   TEMPERATURE:  Current - Temp: 98.6 °F (37 °C); Max - Temp  Av.3 °F (36.8 °C)  Min: 98.1 °F (36.7 °C)  Max: 98.6 °F (37 °C)    Constitutional: Alert and oriented x 4. No acute distress. Respiratory: Respirations nonlabored, no crepitus  GI: Abdomen nondistended, soft, and nontender. Neurological: No focal deficits noted. No asterixis. Data    Data Review:    Recent Labs     23  1605   WBC 7.5   HGB 11.2*   HCT 34.6*   MCV 83.1        Recent Labs     23  1605 01/10/23  0521    143   K 4.2 3.9    106   CO2 24 24   BUN 14 11   CREATININE 1.0 0.9     Recent Labs     23  1605   AST 16   ALT 10   BILITOT 0.4   ALKPHOS 97     Recent Labs     23  1605   LIPASE 7.0*     No results for input(s): PROTIME, INR in the last 72 hours. No results for input(s): PTT in the last 72 hours. ASSESSMENT     Nausea and vomiting - began in 2022 and symptoms have been worsening lately. Upper GI showed recurrence of hiatal hernia - this could be source of her N/V. There was concern for esophageal dysmotility and she does report dysphagia as well. EGD  with normal esophagus, antral gastritis. Biopsies negative for H. pylori. May have gastroparesis.   Will need GES at some point but do not think she will tolerate this currently. Appreciate Pharm. D. review of meds. Danitza for Reglan. She reports improvement with scoplamine patch. Constipation  -having loose stool now. 1 yesterday and 1 today. I did not see a lot of stool on CT 1/9. X-ray pending. PLAN    -Antiemetics  -Reglan  -Follow-up x-ray  -scopolamine patch  - ADAT to low fat, low fiber diet    Discussed with Dr. Alec Masterson, [place]    Patient was seen and examined with nurse practitioner, I was present for all parts of history and physical exam, I helped formulate and finalize plan of care. 51-year-old patient with unexplained abdominal pain, nausea/vomiting overall improved after treatment of UTI with IV antibiotics. Also responding to scopolamine patch. Had episode of emesis this morning. Suspect gastroparesis, liquid phase 2 hr GET study was delayed. Chronic narcotic use could also likely cause narcotic induced gastroparesis and constipation. Patient responding to enema, has had multiple bowel movements today. Abdominal x-ray  Discussed with pharmacy danitza to continue scopolamine patch and use Reglan either as needed or scheduled.

## 2023-01-12 NOTE — PROGRESS NOTES
Clinical Pharmacy Note    Medication changes  Patient is no longer on citalopram 40 mg, sitagliptan 100 mg, glimepiride 4 mg, hydrocodone-acetaminophen  mg, or estrogens conjugated. No obvious contraindications to adding on Reglan (metoclopramide) to this patient's current drug regimen. List of medications patient is currently taking is complete. Source of medications in list are [employer], patient. Current Outpatient Medications on File Prior to Encounter   Medication Sig Dispense Refill    buprenorphine-naloxone (SUBOXONE) 4-1 MG FILM SL film Place 1.5 Film under the tongue daily. venlafaxine (EFFEXOR XR) 150 MG extended release capsule Take 150 mg by mouth daily. TRESIBA FLEXTOUCH 200 UNIT/ML SOPN Inject 110 Units into the skin at bedtime      lisinopril (PRINIVIL;ZESTRIL) 20 MG tablet Take 20 mg by mouth daily. ondansetron (ZOFRAN-ODT) 4 MG disintegrating tablet Place 4 mg under the tongue every 8 hours as needed for Nausea or Vomiting      pregabalin (LYRICA) 75 MG capsule Take 75 mg by mouth 2 times daily. ranolazine (RANEXA) 500 MG extended release tablet Take 500 mg by mouth 2 times daily      simvastatin (ZOCOR) 20 MG tablet Take 20 mg by mouth daily. NOVOLOG FLEXPEN 100 UNIT/ML injection pen Inject 6-40 Units into the skin 3 times daily (before meals) SSI (BG-110)/4 = # of units to give      metformin (GLUCOPHAGE) 1000 MG tablet Take 1,000 mg by mouth 2 times daily (with meals). metoprolol (LOPRESSOR) 50 MG tablet Take 50 mg by mouth daily. [DISCONTINUED] HYDROcodone-acetaminophen (NORCO)  MG per tablet Take 1 tablet by mouth every 6 hours as needed. [DISCONTINUED] citalopram (CELEXA) 40 MG tablet Take 40 mg by mouth See Admin Instructions. 1 1/2 TABS DAILY      [DISCONTINUED] estrogens conjugated, synthetic A, (CENESTIN) 1.25 MG tablet Take 1.25 mg by mouth daily.  (Patient not taking: Reported on 1/9/2023)        Medication timing and last doses have been updated, patient prefers to take her medications nightly.     Taylor Shell, PharmD  PGY-1 Pharmacy Resident  [id number]

## 2023-01-12 NOTE — PROGRESS NOTES
Patient had loose stool, stool got on bed and floor before she could get to bathroom, patient refused midnight enema, preferred to wait until morning before getting enema.

## 2023-01-13 VITALS
SYSTOLIC BLOOD PRESSURE: 160 MMHG | HEIGHT: 66 IN | HEART RATE: 62 BPM | DIASTOLIC BLOOD PRESSURE: 83 MMHG | TEMPERATURE: 98.8 F | OXYGEN SATURATION: 93 % | WEIGHT: 240 LBS | RESPIRATION RATE: 17 BRPM | BODY MASS INDEX: 38.57 KG/M2

## 2023-01-13 LAB
ANION GAP SERPL CALCULATED.3IONS-SCNC: 12 MMOL/L (ref 3–16)
BASOPHILS ABSOLUTE: 0.1 K/UL (ref 0–0.2)
BASOPHILS RELATIVE PERCENT: 1.1 %
BUN BLDV-MCNC: 4 MG/DL (ref 7–20)
CALCIUM SERPL-MCNC: 9.3 MG/DL (ref 8.3–10.6)
CHLORIDE BLD-SCNC: 103 MMOL/L (ref 99–110)
CO2: 26 MMOL/L (ref 21–32)
CREAT SERPL-MCNC: 0.8 MG/DL (ref 0.6–1.2)
EOSINOPHILS ABSOLUTE: 0 K/UL (ref 0–0.6)
EOSINOPHILS RELATIVE PERCENT: 0.5 %
GFR SERPL CREATININE-BSD FRML MDRD: >60 ML/MIN/{1.73_M2}
GLUCOSE BLD-MCNC: 178 MG/DL (ref 70–99)
GLUCOSE BLD-MCNC: 201 MG/DL (ref 70–99)
GLUCOSE BLD-MCNC: 237 MG/DL (ref 70–99)
HCT VFR BLD CALC: 37.2 % (ref 36–48)
HEMOGLOBIN: 12.1 G/DL (ref 12–16)
LYMPHOCYTES ABSOLUTE: 2.1 K/UL (ref 1–5.1)
LYMPHOCYTES RELATIVE PERCENT: 23.1 %
MAGNESIUM: 1.5 MG/DL (ref 1.8–2.4)
MCH RBC QN AUTO: 26.7 PG (ref 26–34)
MCHC RBC AUTO-ENTMCNC: 32.6 G/DL (ref 31–36)
MCV RBC AUTO: 82.1 FL (ref 80–100)
MONOCYTES ABSOLUTE: 0.7 K/UL (ref 0–1.3)
MONOCYTES RELATIVE PERCENT: 7.5 %
NEUTROPHILS ABSOLUTE: 6.2 K/UL (ref 1.7–7.7)
NEUTROPHILS RELATIVE PERCENT: 67.8 %
PDW BLD-RTO: 14 % (ref 12.4–15.4)
PERFORMED ON: ABNORMAL
PERFORMED ON: ABNORMAL
PLATELET # BLD: 349 K/UL (ref 135–450)
PMV BLD AUTO: 6.7 FL (ref 5–10.5)
POTASSIUM REFLEX MAGNESIUM: 3.3 MMOL/L (ref 3.5–5.1)
RBC # BLD: 4.53 M/UL (ref 4–5.2)
SODIUM BLD-SCNC: 141 MMOL/L (ref 136–145)
WBC # BLD: 9.1 K/UL (ref 4–11)

## 2023-01-13 PROCEDURE — 6370000000 HC RX 637 (ALT 250 FOR IP): Performed by: PHYSICIAN ASSISTANT

## 2023-01-13 PROCEDURE — 6370000000 HC RX 637 (ALT 250 FOR IP): Performed by: INTERNAL MEDICINE

## 2023-01-13 PROCEDURE — 80048 BASIC METABOLIC PNL TOTAL CA: CPT

## 2023-01-13 PROCEDURE — 6360000002 HC RX W HCPCS: Performed by: INTERNAL MEDICINE

## 2023-01-13 PROCEDURE — 2580000003 HC RX 258: Performed by: INTERNAL MEDICINE

## 2023-01-13 PROCEDURE — 6360000002 HC RX W HCPCS: Performed by: PHYSICIAN ASSISTANT

## 2023-01-13 PROCEDURE — C9113 INJ PANTOPRAZOLE SODIUM, VIA: HCPCS | Performed by: PHYSICIAN ASSISTANT

## 2023-01-13 PROCEDURE — 85025 COMPLETE CBC W/AUTO DIFF WBC: CPT

## 2023-01-13 PROCEDURE — 83036 HEMOGLOBIN GLYCOSYLATED A1C: CPT

## 2023-01-13 PROCEDURE — 36415 COLL VENOUS BLD VENIPUNCTURE: CPT

## 2023-01-13 PROCEDURE — 83735 ASSAY OF MAGNESIUM: CPT

## 2023-01-13 RX ORDER — AMLODIPINE BESYLATE 5 MG/1
5 TABLET ORAL DAILY
Status: DISCONTINUED | OUTPATIENT
Start: 2023-01-13 | End: 2023-01-13 | Stop reason: HOSPADM

## 2023-01-13 RX ORDER — MAGNESIUM SULFATE IN WATER 40 MG/ML
2000 INJECTION, SOLUTION INTRAVENOUS ONCE
Status: COMPLETED | OUTPATIENT
Start: 2023-01-13 | End: 2023-01-13

## 2023-01-13 RX ORDER — PANTOPRAZOLE SODIUM 40 MG/1
40 TABLET, DELAYED RELEASE ORAL DAILY
Qty: 30 TABLET | Refills: 1 | Status: SHIPPED | OUTPATIENT
Start: 2023-01-13

## 2023-01-13 RX ORDER — POTASSIUM CHLORIDE 20 MEQ/1
40 TABLET, EXTENDED RELEASE ORAL ONCE
Status: COMPLETED | OUTPATIENT
Start: 2023-01-13 | End: 2023-01-13

## 2023-01-13 RX ORDER — METOCLOPRAMIDE 5 MG/1
TABLET ORAL
Qty: 90 TABLET | Refills: 0 | Status: SHIPPED | OUTPATIENT
Start: 2023-01-13

## 2023-01-13 RX ORDER — AMLODIPINE BESYLATE 5 MG/1
5 TABLET ORAL DAILY
Qty: 30 TABLET | Refills: 1 | Status: SHIPPED | OUTPATIENT
Start: 2023-01-14

## 2023-01-13 RX ORDER — PROMETHAZINE HYDROCHLORIDE 25 MG/1
25 SUPPOSITORY RECTAL EVERY 6 HOURS PRN
Qty: 30 SUPPOSITORY | Refills: 0 | Status: SHIPPED | OUTPATIENT
Start: 2023-01-13 | End: 2023-02-12

## 2023-01-13 RX ORDER — SCOLOPAMINE TRANSDERMAL SYSTEM 1 MG/1
1 PATCH, EXTENDED RELEASE TRANSDERMAL
Qty: 10 PATCH | Refills: 0 | Status: SHIPPED | OUTPATIENT
Start: 2023-01-13 | End: 2023-02-12

## 2023-01-13 RX ADMIN — BUPRENORPHINE AND NALOXONE 3 FILM: 2; .5 FILM BUCCAL; SUBLINGUAL at 08:24

## 2023-01-13 RX ADMIN — RANOLAZINE 500 MG: 500 TABLET, EXTENDED RELEASE ORAL at 08:16

## 2023-01-13 RX ADMIN — NALOXEGOL OXALATE 25 MG: 25 TABLET, FILM COATED ORAL at 05:22

## 2023-01-13 RX ADMIN — LISINOPRIL 20 MG: 20 TABLET ORAL at 08:17

## 2023-01-13 RX ADMIN — METOCLOPRAMIDE 10 MG: 10 TABLET ORAL at 11:01

## 2023-01-13 RX ADMIN — MAGNESIUM SULFATE HEPTAHYDRATE 2000 MG: 40 INJECTION, SOLUTION INTRAVENOUS at 11:42

## 2023-01-13 RX ADMIN — POTASSIUM CHLORIDE 40 MEQ: 1500 TABLET, EXTENDED RELEASE ORAL at 11:00

## 2023-01-13 RX ADMIN — PREGABALIN 75 MG: 75 CAPSULE ORAL at 08:17

## 2023-01-13 RX ADMIN — Medication 10 ML: at 08:56

## 2023-01-13 RX ADMIN — ENOXAPARIN SODIUM 30 MG: 100 INJECTION SUBCUTANEOUS at 08:16

## 2023-01-13 RX ADMIN — INSULIN LISPRO 2 UNITS: 100 INJECTION, SOLUTION INTRAVENOUS; SUBCUTANEOUS at 08:17

## 2023-01-13 RX ADMIN — ATORVASTATIN CALCIUM 10 MG: 10 TABLET, FILM COATED ORAL at 08:17

## 2023-01-13 RX ADMIN — PANTOPRAZOLE SODIUM 40 MG: 40 INJECTION, POWDER, FOR SOLUTION INTRAVENOUS at 08:17

## 2023-01-13 RX ADMIN — VENLAFAXINE HYDROCHLORIDE 150 MG: 150 CAPSULE, EXTENDED RELEASE ORAL at 08:16

## 2023-01-13 RX ADMIN — AMLODIPINE BESYLATE 5 MG: 5 TABLET ORAL at 09:43

## 2023-01-13 RX ADMIN — METOCLOPRAMIDE 10 MG: 10 TABLET ORAL at 05:22

## 2023-01-13 NOTE — PROGRESS NOTES
Discharge instructions and medications reviewed with patient. Patient voices understanding and denies further questions/ concerns at this time. Patient transported home by family.

## 2023-01-13 NOTE — PROGRESS NOTES
[facility] PROGRESS NOTE    1/12/2023 7:02 PM        Name: Pablito Perez . Admitted: 1/9/2023  Primary Care Provider: Ela Black (Tel: [phone])      Subjective:  . Patient seen this am.Feeling better today. No vomiting. Nausea very mild. Has had a few loose stools. EGD unremarkable except some linear antral erosions.          Reviewed interval ancillary notes    Current Medications  metoclopramide (REGLAN) tablet 10 mg, 4x Daily AC & HS  lisinopril (PRINIVIL;ZESTRIL) tablet 20 mg, Daily  ranolazine (RANEXA) extended release tablet 500 mg, BID  atorvastatin (LIPITOR) tablet 10 mg, Daily  pregabalin (LYRICA) capsule 75 mg, BID  [START ON 1/13/2023] venlafaxine (EFFEXOR XR) extended release capsule 150 mg, Daily with breakfast  promethazine (PHENERGAN) suppository 25 mg, Q6H PRN  promethazine (PHENERGAN) tablet 25 mg, Q6H PRN  promethazine (PHENERGAN) 6.25 mg in sodium chloride 0.9 % 50 mL IVPB, Q6H PRN  naloxegol (MOVANTIK) tablet 25 mg, QAM AC  scopolamine (TRANSDERM-SCOP) transdermal patch 1 patch, Q72H  pantoprazole (PROTONIX) injection 40 mg, Daily  buprenorphine-naloxone (SUBOXONE) 2-0.5 MG SL film 3 Film, Daily  dextrose bolus 10% 125 mL, PRN   Or  dextrose bolus 10% 250 mL, PRN  glucagon (rDNA) injection 1 mg, PRN  dextrose 10 % infusion, Continuous PRN  cefTRIAXone (ROCEPHIN) 1,000 mg in dextrose 5 % 50 mL IVPB mini-bag, Q24H  insulin lispro (HUMALOG) injection vial 0-8 Units, TID WC  insulin lispro (HUMALOG) injection vial 0-4 Units, Nightly  sodium chloride flush 0.9 % injection 5-40 mL, 2 times per day  sodium chloride flush 0.9 % injection 5-40 mL, PRN  0.9 % sodium chloride infusion, PRN  enoxaparin Sodium (LOVENOX) injection 30 mg, BID  ondansetron (ZOFRAN-ODT) disintegrating tablet 4 mg, Q8H PRN   Or  ondansetron (ZOFRAN) injection 4 mg, Q6H PRN  polyethylene glycol (GLYCOLAX) packet 17 g, Daily PRN  acetaminophen (TYLENOL) tablet 650 mg, Q6H PRN   Or  acetaminophen (TYLENOL) suppository 650 mg, Q6H PRN  lactated ringers infusion, Continuous      Objective:  BP (!) 157/82   Pulse 65   Temp 98.8 °F (37.1 °C) (Oral)   Resp 18   Ht 5' 6\" (1.676 m)   Wt 240 lb (108.9 kg)   SpO2 96%   BMI 38.74 kg/m²   No intake or output data in the 24 hours ending 01/12/23 1902     Wt Readings from Last 3 Encounters:   01/09/23 240 lb (108.9 kg)       General appearance:  Appears comfortable after phenergan  Eyes: Sclera clear. Pupils equal.  ENT: Moist oral mucosa. Trachea midline, no adenopathy. Cardiovascular: Regular rhythm, normal S1, S2. No murmur. No edema in lower extremities  Respiratory: Not using accessory muscles. Good inspiratory effort. Clear to auscultation bilaterally, no wheeze or crackles. GI: Abdomen soft, no tenderness, not distended, normal bowel sounds  Musculoskeletal: No cyanosis in digits, neck supple  Neurology: CN 2-12 grossly intact. No speech or motor deficits  Psych: Normal affect. Alert and oriented in time, place and person  Skin: Warm, dry, normal turgor    Labs and Tests:  CBC:   No results for input(s): WBC, HGB, PLT in the last 72 hours. BMP:    Recent Labs     01/10/23  0521      K 3.9      CO2 24   BUN 11   CREATININE 0.9   GLUCOSE 121*       Hepatic:   No results for input(s): AST, ALT, ALB, BILITOT, ALKPHOS in the last 72 hours. C abd/pelvis  Impression:     1. Moderate-sized hiatus hernia. 2. Negative for bowel obstruction. EGD  Impression:  -     - The GE junction was located at 40 cm, no erosive esophagitis or Dwyer's esophagus noted. - Normal caliber esophagus, no esophageal strictures noted. Linear antral gastritis, no gastric erosions or gastric ulcer seen. - Patent pylorus. Random gastric biopsies obtained to rule out H. pylori.  - Normal duodenum     Recommendations: -Acid suppression with a proton pump inhibitor. , -Await pathology. - Patient much improved today after scopolamine patch and iv aBX FOR UTI. -Continue scopolamine patch, recommend switching IV Phenergan to Phenergan rectal suppository and oral Phenergan. -Advance diet  - will discuss reglan with pharmacy ( for drug interaction)      Problem List  Principal Problem:    UTI (urinary tract infection)  Resolved Problems:    * No resolved hospital problems. *       Assessment & Plan:   UTI-cultures positive for ecoli. Complete 3rd dose of rocephin today and dc. Intractable nausea and vomiting-CT unremarkable. EGD unremarkable except a few antral erosion. Discussed with GI. Symptoms possibly secondary to gastroparesis vs her hiatral hernia. Will continue scopolamine patch and start reglan. Outpatient gastric emptying scan. Dm  2-on amaryl and [place] and [place] at home. SSI for now  Disposition-possible dc tomorrow. Diet: ADULT DIET;  Full Liquid; 4 carb choices (60 gm/meal)  ADULT ORAL NUTRITION SUPPLEMENT; Breakfast, Dinner; Diabetic Oral Supplement  Code:Full Code        Polly Reyes PA-C   1/12/2023 7:02 PM

## 2023-01-13 NOTE — PROGRESS NOTES
Gastroenterology Progress Note      Aster Boyce is a 79 y.o. female patient. 1. Abdominal pain in female    2. Urinary tract infection in female    3. Nausea and vomiting, unspecified vomiting type    4. Diabetes mellitus type 2 in obese (HCC)        SUBJECTIVE: Feels good. Tolerating solid food. No further nausea or vomiting. Stools were loose but now starting to firm up. ROS:  Cardiovascular ROS: no chest pain or dyspnea on exertion  Gastrointestinal ROS: see hpi  Respiratory ROS: no cough, shortness of breath, or wheezing    Physical    VITALS:  BP (!) 160/83   Pulse 62   Temp 98.8 °F (37.1 °C) (Oral)   Resp 17   Ht 5' 6\" (1.676 m)   Wt 240 lb (108.9 kg)   SpO2 93%   BMI 38.74 kg/m²   TEMPERATURE:  Current - Temp: 98.8 °F (37.1 °C); Max - Temp  Av.5 °F (36.9 °C)  Min: 98 °F (36.7 °C)  Max: 98.9 °F (37.2 °C)    Constitutional: Alert and oriented x 4. No acute distress. Respiratory: Respirations nonlabored, no crepitus  GI: Abdomen nondistended, soft, and nontender. Neurological: No focal deficits noted. No asterixis. Data    Data Review:    Recent Labs     23  0935   WBC 9.1   HGB 12.1   HCT 37.2   MCV 82.1          Recent Labs     23  0935      K 3.3*      CO2 26   BUN 4*   CREATININE 0.8       No results for input(s): AST, ALT, ALB, BILIDIR, BILITOT, ALKPHOS in the last 72 hours. No results for input(s): LIPASE, AMYLASE in the last 72 hours. No results for input(s): PROTIME, INR in the last 72 hours. No results for input(s): PTT in the last 72 hours. ASSESSMENT     Nausea and vomiting - began in 2022 and symptoms have been worsening lately. Upper GI showed recurrence of hiatal hernia - this could contribute to her N/V. There was concern for esophageal dysmotility and she does report dysphagia as well. EGD  with normal esophagus, antral gastritis. Biopsies negative for H. pylori. May have gastroparesis.   Will need 4-hour GES at some point but she was not able to tolerate this to be done inpatient. Appreciate Pharm. D. review of meds. El for Reglan. Reglan was started yesterday. She reports improvement with scoplamine patch and this has been off since yesterday. Constipation  -having loose stool. I did not see a lot of stool on CT 1/9. X-ray with no significant stool. PLAN    -Antiemetics  -Reglan 5 mg 3 times daily as needed at home  -scopolamine patch as needed  -low fat, low fiber diet  -Complete gastric emptying study outpatient as ordered per Dr. Sawyer Brotino with Dr. Donny Munoz  -MiraLAX daily and titrate to effect    Will sign off. Please call if questions. Discussed with Dr. Alec Masterson PA-C  [facility]    Patient was seen and examined with nurse practitioner, I was present for all parts of history and physical exam, I helped formulate and finalize plan of care. Pleasant 79year-old patient admitted with refractory nausea, vomiting and UTI. Improved after treating UTI with antibiotics. Also improved with scopolamine patch. Was started on Reglan as needed yesterday after discussion with pharmacist.  Recommend outpatient Reglan 3 times a day as needed, scopolamine patch, low residue diet. Follow-up with Dr. Donny Munoz for 4-hour gastric emptying scan. Also recommend bowel regimen for chronic constipation.

## 2023-01-13 NOTE — PROGRESS NOTES
Nutrition Education    Educated on gastroparesis diet  Learners: Patient and Family  Readiness: Acceptance  Method: Explanation and Handout  Response: Verbalizes Understanding  Contact name and number provided.     Elfida Galeazzi, RD, ANNA  Contact Number: [phone]

## 2023-01-13 NOTE — CARE COORDINATION
01/13/23 1212   IMM Letter   IMM Letter given to Patient/Family/Significant other/Guardian/POA/by: Patient   IMM Letter date given: 01/13/23   IMM Letter time given: 555 CEE MontesN RN    [facility]  Phone: [phone]

## 2023-01-13 NOTE — PLAN OF CARE
Problem: Discharge Planning  Goal: Discharge to home or other facility with appropriate resources  1/13/2023 0917 by Janet Wesley RN  Outcome: Progressing  1/13/2023 0917 by Janet Wesley RN  Outcome: Progressing  1/12/2023 2002 by Ida Landin RN  Outcome: Progressing     Problem: Safety - Adult  Goal: Free from fall injury  1/13/2023 0917 by Janet Wesley RN  Outcome: Progressing  1/13/2023 0917 by Janet Wesley RN  Outcome: Progressing  1/12/2023 2002 by Ida Landin RN  Outcome: Progressing     Problem: Chronic Conditions and Co-morbidities  Goal: Patient's chronic conditions and co-morbidity symptoms are monitored and maintained or improved  1/13/2023 0917 by Janet Wesley RN  Outcome: Progressing  1/13/2023 0917 by Janet Wesley RN  Outcome: Progressing  1/12/2023 2002 by Ida Landin RN  Outcome: Progressing     Problem: Nutrition Deficit:  Goal: Optimize nutritional status  1/13/2023 0917 by Janet Wesley RN  Outcome: Progressing  1/12/2023 2002 by Ida Landin RN  Outcome: Progressing

## 2023-01-13 NOTE — PROGRESS NOTES
Shift assessment complete, VSS, medications given per MAR. Patient denies pain and N/V. Patient ambulating in room. Daughter at bedside, patient denies any further needs at this time. The care plan and education has been reviewed and mutually agreed upon with the patient.

## 2023-01-13 NOTE — PROGRESS NOTES
Shift assessment, alert and oriented X4, VSS . Family at bedside. Denies abdominal pain, no nausea and vomiting. No BM overnight, voiding. The care plan and education has been reviewed and mutually agreed upon with the patient.

## 2023-01-14 LAB
ESTIMATED AVERAGE GLUCOSE: 148.5 MG/DL
HBA1C MFR BLD: 6.8 %

## 2023-01-18 NOTE — DISCHARGE SUMMARY
[facility]    Patient Demographics    Patient. Randell Seek  Date of Birth. 1955  MRN. [id number]     Primary care provider. [id number] [facility] [place]  (Tel: [phone])    Admit date: 1/9/2023    Discharge date (blank if same as Note Date): 1/13/2023  Note Date: 1/18/2023     Reason for Hospitalization. Chief Complaint   Patient presents with    Abdominal Pain     Arrived per family d/t abd pain, vomiting, bowel inpaction PCP called and gave dx and told to come to ER           Significant Findings. Principal Problem:    UTI (urinary tract infection)  Resolved Problems:    * No resolved hospital problems. *       Problems and results from this hospitalization that need follow up. Nausea and vomiting-needs outpatient gastric emptying scan    Significant test results and incidental findings. CT ABDOMEN PELVIS W IV CONTRAST Additional Contrast? None   Final Result   1. Moderate-sized hiatus hernia. 2. Negative for bowel obstruction. EGD  Impression:  -     - The GE junction was located at 40 cm, no erosive esophagitis or Dwyer's esophagus noted. - Normal caliber esophagus, no esophageal strictures noted. Linear antral gastritis, no gastric erosions or gastric ulcer seen. - Patent pylorus. Random gastric biopsies obtained to rule out H. pylori.  - Normal duodenum     Recommendations: -Acid suppression with a proton pump inhibitor. , -Await pathology. - Patient much improved today after scopolamine patch and iv aBX FOR UTI. -Continue scopolamine patch, recommend switching IV Phenergan to Phenergan rectal suppository and oral Phenergan. -Advance diet  Invasive procedures and treatments. None EGD    [facility]. Patient is a 78 yo female who presented to the hospital with a one month history of nausea and vomiting.  She was also having urinary frequency. In the ED, UA was suggesting of UTI. CT abd/pelvis revealed a moderate hiatal hernia. She has previously had surgery x 2 for the hiatal hernia. She was admitted and started on IV abx. Urine cultures were positive for ecoli. She was seen by GI and underwent an EGD which revealed linear antral gastritis but was otherwise unremarkable. PPI was recommended. She was also started on reglan. Her symptoms improved with abx, scopolamine patch, and reglan. She will need an outpatient gastric emptying scan as she may have gastroparesis. If this is unremarkable, her symptoms may be secondary to her hiatal hernia. Patient was tolerating a diet and was discharged home in stable condition. She was counseled regarding possibility of developing tardive dyskinesia with reglan. She was also informed this can be permanent. She understands and is agreeable. Patient was instructed to take this as needed. Consults. IP CONSULT TO GI    Physical examination on discharge day. BP (!) 160/83   Pulse 62   Temp 98.8 °F (37.1 °C) (Oral)   Resp 17   Ht 5' 6\" (1.676 m)   Wt 240 lb (108.9 kg)   SpO2 93%   BMI 38.74 kg/m²   General appearance. Alert. Looks comfortable. HEENT. Sclera clear. Moist mucus membranes. Cardiovascular. Regular rate and rhythm, normal S1, S2. No murmur. Respiratory. Not using accessory muscles. Clear to auscultation bilaterally, no wheeze. Gastrointestinal. Abdomen soft, non-tender, not distended, normal bowel sounds  Neurology. Facial symmetry. No speech deficits. Moving all extremities equally. Extremities. No edema in lower extremities. Skin. Warm, dry, normal turgor    Condition at time of discharge stable    Medication instructions provided to patient at discharge.      Medication List        START taking these medications      amLODIPine 5 MG tablet  Commonly known as: NORVASC  Take 1 tablet by mouth daily     metoclopramide 5 MG tablet  Commonly known as: REGLAN  Take three times daily before meals as needed for nausea     pantoprazole 40 MG tablet  Commonly known as: PROTONIX  Take 1 tablet by mouth daily     promethazine 25 MG suppository  Commonly known as: PHENERGAN  Place 1 suppository rectally every 6 hours as needed for Nausea     scopolamine transdermal patch  Commonly known as: TRANSDERM-SCOP  Place 1 patch onto the skin every 72 hours            CONTINUE taking these medications      lisinopril 20 MG tablet  Commonly known as: PRINIVIL;ZESTRIL     metFORMIN 1000 MG tablet  Commonly known as: GLUCOPHAGE     NovoLOG FlexPen 100 UNIT/ML injection pen  Generic drug: insulin aspart     ondansetron 4 MG disintegrating tablet  Commonly known as: ZOFRAN-ODT     pregabalin 75 MG capsule  Commonly known as: LYRICA     ranolazine 500 MG extended release tablet  Commonly known as: RANEXA     simvastatin 20 MG tablet  Commonly known as: ZOCOR     Suboxone 4-1 MG Film SL film  Generic drug: buprenorphine-naloxone     Tresiba FlexTouch 200 UNIT/ML Sopn  Generic drug: Insulin Degludec     venlafaxine 150 MG extended release capsule  Commonly known as: EFFEXOR XR            STOP taking these medications      metoprolol tartrate 50 MG tablet  Commonly known as: LOPRESSOR               Where to Get Your Medications        These medications were sent to [facility] [address] [facility] [address]      Phone: [phone]   amLODIPine 5 MG tablet  metoclopramide 5 MG tablet  pantoprazole 40 MG tablet  promethazine 25 MG suppository  scopolamine transdermal patch         Discharge recommendations given to patient. Follow Up. PCP in 1 week, GI in 2 weeks   Disposition. home  Activity. activity as tolerated  Diet: No diet orders on file      Spent 38 minutes in discharge process. Signed:   Cosmo Maldonado PA-C     1/18/2023 2:27 PM

## 2023-01-23 ENCOUNTER — APPOINTMENT (OUTPATIENT)
Dept: GENERAL RADIOLOGY | Age: 68
End: 2023-01-23
Payer: MEDICARE

## 2023-01-23 ENCOUNTER — APPOINTMENT (OUTPATIENT)
Dept: CT IMAGING | Age: 68
End: 2023-01-23
Payer: MEDICARE

## 2023-01-23 ENCOUNTER — HOSPITAL ENCOUNTER (INPATIENT)
Age: 68
LOS: 4 days | Discharge: HOME | End: 2023-01-27
Attending: EMERGENCY MEDICINE | Admitting: INTERNAL MEDICINE
Payer: MEDICARE

## 2023-01-23 DIAGNOSIS — N30.00 ACUTE CYSTITIS WITHOUT HEMATURIA: ICD-10-CM

## 2023-01-23 DIAGNOSIS — R41.0 ACUTE DELIRIUM: Primary | ICD-10-CM

## 2023-01-23 PROBLEM — E11.9 TYPE 2 DIABETES MELLITUS (HCC): Status: ACTIVE | Noted: 2023-01-23

## 2023-01-23 PROBLEM — R45.1 AGITATION: Status: ACTIVE | Noted: 2023-01-23

## 2023-01-23 PROBLEM — E78.5 HLD (HYPERLIPIDEMIA): Status: ACTIVE | Noted: 2023-01-23

## 2023-01-23 PROBLEM — T50.905A MEDICATION ADVERSE EFFECT: Status: ACTIVE | Noted: 2023-01-23

## 2023-01-23 PROBLEM — G93.41 ACUTE METABOLIC ENCEPHALOPATHY: Status: ACTIVE | Noted: 2023-01-23

## 2023-01-23 LAB
A/G RATIO: 1.1 (ref 1.1–2.2)
ALBUMIN SERPL-MCNC: 3.9 G/DL (ref 3.4–5)
ALP BLD-CCNC: 108 U/L (ref 40–129)
ALT SERPL-CCNC: 11 U/L (ref 10–40)
ANION GAP SERPL CALCULATED.3IONS-SCNC: 10 MMOL/L (ref 3–16)
AST SERPL-CCNC: 21 U/L (ref 15–37)
BACTERIA: ABNORMAL /HPF
BASE EXCESS VENOUS: 2.7 MMOL/L (ref -3–3)
BASOPHILS ABSOLUTE: 0 K/UL (ref 0–0.2)
BASOPHILS RELATIVE PERCENT: 0.6 %
BILIRUB SERPL-MCNC: 0.5 MG/DL (ref 0–1)
BILIRUBIN URINE: NEGATIVE
BLOOD, URINE: ABNORMAL
BUN BLDV-MCNC: 10 MG/DL (ref 7–20)
CALCIUM SERPL-MCNC: 9.7 MG/DL (ref 8.3–10.6)
CARBOXYHEMOGLOBIN: 2.2 % (ref 0–1.5)
CHLORIDE BLD-SCNC: 101 MMOL/L (ref 99–110)
CLARITY: ABNORMAL
CO2: 27 MMOL/L (ref 21–32)
COLOR: YELLOW
CREAT SERPL-MCNC: 0.8 MG/DL (ref 0.6–1.2)
EOSINOPHILS ABSOLUTE: 0.1 K/UL (ref 0–0.6)
EOSINOPHILS RELATIVE PERCENT: 1.6 %
EPITHELIAL CELLS, UA: 12 /HPF (ref 0–5)
GFR SERPL CREATININE-BSD FRML MDRD: >60 ML/MIN/{1.73_M2}
GLUCOSE BLD-MCNC: 233 MG/DL (ref 70–99)
GLUCOSE URINE: NEGATIVE MG/DL
HCO3 VENOUS: 28.7 MMOL/L (ref 23–29)
HCT VFR BLD CALC: 37 % (ref 36–48)
HEMOGLOBIN, VEN, REDUCED: 5 %
HEMOGLOBIN: 11.9 G/DL (ref 12–16)
HYALINE CASTS: 4 /LPF (ref 0–8)
KETONES, URINE: NEGATIVE MG/DL
LACTIC ACID, SEPSIS: 1.2 MMOL/L (ref 0.4–1.9)
LEUKOCYTE ESTERASE, URINE: ABNORMAL
LIPASE: 6 U/L (ref 13–60)
LYMPHOCYTES ABSOLUTE: 2.1 K/UL (ref 1–5.1)
LYMPHOCYTES RELATIVE PERCENT: 28.4 %
MCH RBC QN AUTO: 26.5 PG (ref 26–34)
MCHC RBC AUTO-ENTMCNC: 32.1 G/DL (ref 31–36)
MCV RBC AUTO: 82.6 FL (ref 80–100)
METHEMOGLOBIN VENOUS: 0.2 %
MICROSCOPIC EXAMINATION: YES
MONOCYTES ABSOLUTE: 0.7 K/UL (ref 0–1.3)
MONOCYTES RELATIVE PERCENT: 10.1 %
NEUTROPHILS ABSOLUTE: 4.3 K/UL (ref 1.7–7.7)
NEUTROPHILS RELATIVE PERCENT: 59.3 %
NITRITE, URINE: POSITIVE
O2 CONTENT, VEN: 16 VOL %
O2 SAT, VEN: 95 %
O2 THERAPY: ABNORMAL
PCO2, VEN: 48.6 MMHG (ref 40–50)
PDW BLD-RTO: 14.3 % (ref 12.4–15.4)
PH UA: 5.5 (ref 5–8)
PH VENOUS: 7.38 (ref 7.35–7.45)
PLATELET # BLD: 334 K/UL (ref 135–450)
PMV BLD AUTO: 7.2 FL (ref 5–10.5)
PO2, VEN: 91.9 MMHG (ref 25–40)
POTASSIUM SERPL-SCNC: 4.6 MMOL/L (ref 3.5–5.1)
PRO-BNP: 150 PG/ML (ref 0–124)
PROCALCITONIN: 0.08 NG/ML (ref 0–0.15)
PROTEIN UA: NEGATIVE MG/DL
RBC # BLD: 4.48 M/UL (ref 4–5.2)
RBC UA: 1 /HPF (ref 0–4)
SODIUM BLD-SCNC: 138 MMOL/L (ref 136–145)
SPECIFIC GRAVITY UA: 1.01 (ref 1–1.03)
TCO2 CALC VENOUS: 68 MMOL/L
TOTAL PROTEIN: 7.6 G/DL (ref 6.4–8.2)
TROPONIN: <0.01 NG/ML
URINE REFLEX TO CULTURE: YES
URINE TYPE: ABNORMAL
UROBILINOGEN, URINE: 0.2 E.U./DL
WBC # BLD: 7.3 K/UL (ref 4–11)
WBC UA: 240 /HPF (ref 0–5)

## 2023-01-23 PROCEDURE — 81001 URINALYSIS AUTO W/SCOPE: CPT

## 2023-01-23 PROCEDURE — 93005 ELECTROCARDIOGRAM TRACING: CPT | Performed by: PHYSICIAN ASSISTANT

## 2023-01-23 PROCEDURE — 87086 URINE CULTURE/COLONY COUNT: CPT

## 2023-01-23 PROCEDURE — 1200000000 HC SEMI PRIVATE

## 2023-01-23 PROCEDURE — 6360000002 HC RX W HCPCS: Performed by: PHYSICIAN ASSISTANT

## 2023-01-23 PROCEDURE — 80053 COMPREHEN METABOLIC PANEL: CPT

## 2023-01-23 PROCEDURE — 2580000003 HC RX 258: Performed by: PHYSICIAN ASSISTANT

## 2023-01-23 PROCEDURE — 85025 COMPLETE CBC W/AUTO DIFF WBC: CPT

## 2023-01-23 PROCEDURE — 84484 ASSAY OF TROPONIN QUANT: CPT

## 2023-01-23 PROCEDURE — 99285 EMERGENCY DEPT VISIT HI MDM: CPT

## 2023-01-23 PROCEDURE — 87077 CULTURE AEROBIC IDENTIFY: CPT

## 2023-01-23 PROCEDURE — 71045 X-RAY EXAM CHEST 1 VIEW: CPT

## 2023-01-23 PROCEDURE — 83605 ASSAY OF LACTIC ACID: CPT

## 2023-01-23 PROCEDURE — 87186 SC STD MICRODIL/AGAR DIL: CPT

## 2023-01-23 PROCEDURE — 83880 ASSAY OF NATRIURETIC PEPTIDE: CPT

## 2023-01-23 PROCEDURE — 70450 CT HEAD/BRAIN W/O DYE: CPT

## 2023-01-23 PROCEDURE — 87184 SC STD DISK METHOD PER PLATE: CPT

## 2023-01-23 PROCEDURE — 83036 HEMOGLOBIN GLYCOSYLATED A1C: CPT

## 2023-01-23 PROCEDURE — 96365 THER/PROPH/DIAG IV INF INIT: CPT

## 2023-01-23 PROCEDURE — 84145 PROCALCITONIN (PCT): CPT

## 2023-01-23 PROCEDURE — 83690 ASSAY OF LIPASE: CPT

## 2023-01-23 PROCEDURE — 82803 BLOOD GASES ANY COMBINATION: CPT

## 2023-01-23 RX ADMIN — CEFTRIAXONE SODIUM 1000 MG: 1 INJECTION, POWDER, FOR SOLUTION INTRAMUSCULAR; INTRAVENOUS at 21:23

## 2023-01-23 ASSESSMENT — ENCOUNTER SYMPTOMS
SHORTNESS OF BREATH: 0
DIARRHEA: 0
RHINORRHEA: 0
NAUSEA: 0
ABDOMINAL PAIN: 0
VOMITING: 0
COUGH: 0

## 2023-01-24 ENCOUNTER — APPOINTMENT (OUTPATIENT)
Dept: GENERAL RADIOLOGY | Age: 68
End: 2023-01-24
Payer: MEDICARE

## 2023-01-24 LAB
ANION GAP SERPL CALCULATED.3IONS-SCNC: 9 MMOL/L (ref 3–16)
BASOPHILS ABSOLUTE: 0.1 K/UL (ref 0–0.2)
BASOPHILS RELATIVE PERCENT: 1 %
BUN BLDV-MCNC: 9 MG/DL (ref 7–20)
CALCIUM SERPL-MCNC: 9.4 MG/DL (ref 8.3–10.6)
CHLORIDE BLD-SCNC: 105 MMOL/L (ref 99–110)
CO2: 30 MMOL/L (ref 21–32)
CREAT SERPL-MCNC: 0.8 MG/DL (ref 0.6–1.2)
EKG ATRIAL RATE: 92 BPM
EKG DIAGNOSIS: NORMAL
EKG P AXIS: 39 DEGREES
EKG P-R INTERVAL: 140 MS
EKG Q-T INTERVAL: 374 MS
EKG QRS DURATION: 90 MS
EKG QTC CALCULATION (BAZETT): 462 MS
EKG R AXIS: -44 DEGREES
EKG T AXIS: 17 DEGREES
EKG VENTRICULAR RATE: 92 BPM
EOSINOPHILS ABSOLUTE: 0.1 K/UL (ref 0–0.6)
EOSINOPHILS RELATIVE PERCENT: 2 %
GFR SERPL CREATININE-BSD FRML MDRD: >60 ML/MIN/{1.73_M2}
GLUCOSE BLD-MCNC: 126 MG/DL (ref 70–99)
GLUCOSE BLD-MCNC: 139 MG/DL (ref 70–99)
GLUCOSE BLD-MCNC: 150 MG/DL (ref 70–99)
GLUCOSE BLD-MCNC: 152 MG/DL (ref 70–99)
GLUCOSE BLD-MCNC: 179 MG/DL (ref 70–99)
GLUCOSE BLD-MCNC: 185 MG/DL (ref 70–99)
HCT VFR BLD CALC: 35.7 % (ref 36–48)
HEMOGLOBIN: 11.6 G/DL (ref 12–16)
LYMPHOCYTES ABSOLUTE: 3.2 K/UL (ref 1–5.1)
LYMPHOCYTES RELATIVE PERCENT: 47.5 %
MCH RBC QN AUTO: 26.8 PG (ref 26–34)
MCHC RBC AUTO-ENTMCNC: 32.5 G/DL (ref 31–36)
MCV RBC AUTO: 82.3 FL (ref 80–100)
MONOCYTES ABSOLUTE: 0.8 K/UL (ref 0–1.3)
MONOCYTES RELATIVE PERCENT: 12.1 %
NEUTROPHILS ABSOLUTE: 2.5 K/UL (ref 1.7–7.7)
NEUTROPHILS RELATIVE PERCENT: 37.4 %
PDW BLD-RTO: 14.7 % (ref 12.4–15.4)
PERFORMED ON: ABNORMAL
PLATELET # BLD: 325 K/UL (ref 135–450)
PMV BLD AUTO: 7.3 FL (ref 5–10.5)
POTASSIUM REFLEX MAGNESIUM: 4.2 MMOL/L (ref 3.5–5.1)
RBC # BLD: 4.33 M/UL (ref 4–5.2)
SODIUM BLD-SCNC: 144 MMOL/L (ref 136–145)
WBC # BLD: 6.7 K/UL (ref 4–11)

## 2023-01-24 PROCEDURE — 74018 RADEX ABDOMEN 1 VIEW: CPT

## 2023-01-24 PROCEDURE — 2580000003 HC RX 258: Performed by: INTERNAL MEDICINE

## 2023-01-24 PROCEDURE — 6360000002 HC RX W HCPCS: Performed by: INTERNAL MEDICINE

## 2023-01-24 PROCEDURE — 36415 COLL VENOUS BLD VENIPUNCTURE: CPT

## 2023-01-24 PROCEDURE — 2580000003 HC RX 258: Performed by: PHYSICIAN ASSISTANT

## 2023-01-24 PROCEDURE — 6370000000 HC RX 637 (ALT 250 FOR IP): Performed by: PHYSICIAN ASSISTANT

## 2023-01-24 PROCEDURE — 85025 COMPLETE CBC W/AUTO DIFF WBC: CPT

## 2023-01-24 PROCEDURE — 80048 BASIC METABOLIC PNL TOTAL CA: CPT

## 2023-01-24 PROCEDURE — 6370000000 HC RX 637 (ALT 250 FOR IP): Performed by: INTERNAL MEDICINE

## 2023-01-24 PROCEDURE — 93010 ELECTROCARDIOGRAM REPORT: CPT | Performed by: INTERNAL MEDICINE

## 2023-01-24 PROCEDURE — 1200000000 HC SEMI PRIVATE

## 2023-01-24 RX ORDER — RANOLAZINE 500 MG/1
500 TABLET, EXTENDED RELEASE ORAL 2 TIMES DAILY
Status: DISCONTINUED | OUTPATIENT
Start: 2023-01-24 | End: 2023-01-27 | Stop reason: HOSPADM

## 2023-01-24 RX ORDER — SCOLOPAMINE TRANSDERMAL SYSTEM 1 MG/1
1 PATCH, EXTENDED RELEASE TRANSDERMAL
Status: DISCONTINUED | OUTPATIENT
Start: 2023-01-24 | End: 2023-01-25

## 2023-01-24 RX ORDER — INSULIN LISPRO 100 [IU]/ML
0-8 INJECTION, SOLUTION INTRAVENOUS; SUBCUTANEOUS
Status: DISCONTINUED | OUTPATIENT
Start: 2023-01-24 | End: 2023-01-27 | Stop reason: HOSPADM

## 2023-01-24 RX ORDER — ATORVASTATIN CALCIUM 10 MG/1
10 TABLET, FILM COATED ORAL NIGHTLY
Status: DISCONTINUED | OUTPATIENT
Start: 2023-01-24 | End: 2023-01-27 | Stop reason: HOSPADM

## 2023-01-24 RX ORDER — LISINOPRIL 20 MG/1
20 TABLET ORAL DAILY
Status: DISCONTINUED | OUTPATIENT
Start: 2023-01-24 | End: 2023-01-27 | Stop reason: HOSPADM

## 2023-01-24 RX ORDER — INSULIN GLARGINE 100 [IU]/ML
35 INJECTION, SOLUTION SUBCUTANEOUS NIGHTLY
Status: DISCONTINUED | OUTPATIENT
Start: 2023-01-24 | End: 2023-01-27 | Stop reason: HOSPADM

## 2023-01-24 RX ORDER — ACETAMINOPHEN 650 MG/1
650 SUPPOSITORY RECTAL EVERY 6 HOURS PRN
Status: DISCONTINUED | OUTPATIENT
Start: 2023-01-24 | End: 2023-01-27 | Stop reason: HOSPADM

## 2023-01-24 RX ORDER — AMLODIPINE BESYLATE 5 MG/1
5 TABLET ORAL DAILY
Status: DISCONTINUED | OUTPATIENT
Start: 2023-01-24 | End: 2023-01-27 | Stop reason: HOSPADM

## 2023-01-24 RX ORDER — SCOLOPAMINE TRANSDERMAL SYSTEM 1 MG/1
1 PATCH, EXTENDED RELEASE TRANSDERMAL
Status: DISCONTINUED | OUTPATIENT
Start: 2023-01-24 | End: 2023-01-24 | Stop reason: ALTCHOICE

## 2023-01-24 RX ORDER — INSULIN GLARGINE 100 [IU]/ML
48 INJECTION, SOLUTION SUBCUTANEOUS NIGHTLY
Status: DISCONTINUED | OUTPATIENT
Start: 2023-01-24 | End: 2023-01-24

## 2023-01-24 RX ORDER — INSULIN LISPRO 100 [IU]/ML
0-4 INJECTION, SOLUTION INTRAVENOUS; SUBCUTANEOUS NIGHTLY
Status: DISCONTINUED | OUTPATIENT
Start: 2023-01-24 | End: 2023-01-27 | Stop reason: HOSPADM

## 2023-01-24 RX ORDER — SODIUM CHLORIDE 9 MG/ML
INJECTION, SOLUTION INTRAVENOUS CONTINUOUS
Status: DISCONTINUED | OUTPATIENT
Start: 2023-01-24 | End: 2023-01-27 | Stop reason: HOSPADM

## 2023-01-24 RX ORDER — SODIUM CHLORIDE 0.9 % (FLUSH) 0.9 %
5-40 SYRINGE (ML) INJECTION PRN
Status: DISCONTINUED | OUTPATIENT
Start: 2023-01-24 | End: 2023-01-27 | Stop reason: HOSPADM

## 2023-01-24 RX ORDER — ENOXAPARIN SODIUM 100 MG/ML
40 INJECTION SUBCUTANEOUS DAILY
Status: DISCONTINUED | OUTPATIENT
Start: 2023-01-24 | End: 2023-01-27 | Stop reason: HOSPADM

## 2023-01-24 RX ORDER — DEXTROSE MONOHYDRATE 100 MG/ML
INJECTION, SOLUTION INTRAVENOUS CONTINUOUS PRN
Status: DISCONTINUED | OUTPATIENT
Start: 2023-01-24 | End: 2023-01-27 | Stop reason: HOSPADM

## 2023-01-24 RX ORDER — SIMVASTATIN 10 MG
20 TABLET ORAL NIGHTLY
Status: DISCONTINUED | OUTPATIENT
Start: 2023-01-24 | End: 2023-01-24 | Stop reason: CLARIF

## 2023-01-24 RX ORDER — ONDANSETRON 2 MG/ML
4 INJECTION INTRAMUSCULAR; INTRAVENOUS EVERY 6 HOURS PRN
Status: DISCONTINUED | OUTPATIENT
Start: 2023-01-24 | End: 2023-01-27 | Stop reason: HOSPADM

## 2023-01-24 RX ORDER — BUPRENORPHINE AND NALOXONE 2; .5 MG/1; MG/1
3 FILM, SOLUBLE BUCCAL; SUBLINGUAL DAILY
Status: DISCONTINUED | OUTPATIENT
Start: 2023-01-24 | End: 2023-01-27 | Stop reason: HOSPADM

## 2023-01-24 RX ORDER — PANTOPRAZOLE SODIUM 40 MG/1
40 TABLET, DELAYED RELEASE ORAL
Status: DISCONTINUED | OUTPATIENT
Start: 2023-01-24 | End: 2023-01-27 | Stop reason: HOSPADM

## 2023-01-24 RX ORDER — ONDANSETRON 4 MG/1
4 TABLET, ORALLY DISINTEGRATING ORAL EVERY 8 HOURS PRN
Status: DISCONTINUED | OUTPATIENT
Start: 2023-01-24 | End: 2023-01-27 | Stop reason: HOSPADM

## 2023-01-24 RX ORDER — POLYETHYLENE GLYCOL 3350 17 G/17G
17 POWDER, FOR SOLUTION ORAL DAILY PRN
Status: DISCONTINUED | OUTPATIENT
Start: 2023-01-24 | End: 2023-01-27 | Stop reason: HOSPADM

## 2023-01-24 RX ORDER — SODIUM CHLORIDE 9 MG/ML
INJECTION, SOLUTION INTRAVENOUS PRN
Status: DISCONTINUED | OUTPATIENT
Start: 2023-01-24 | End: 2023-01-27 | Stop reason: HOSPADM

## 2023-01-24 RX ORDER — ACETAMINOPHEN 325 MG/1
650 TABLET ORAL EVERY 6 HOURS PRN
Status: DISCONTINUED | OUTPATIENT
Start: 2023-01-24 | End: 2023-01-27 | Stop reason: HOSPADM

## 2023-01-24 RX ORDER — VENLAFAXINE HYDROCHLORIDE 150 MG/1
150 CAPSULE, EXTENDED RELEASE ORAL DAILY
Status: DISCONTINUED | OUTPATIENT
Start: 2023-01-24 | End: 2023-01-27 | Stop reason: HOSPADM

## 2023-01-24 RX ORDER — SODIUM CHLORIDE 9 MG/ML
INJECTION, SOLUTION INTRAVENOUS CONTINUOUS
Status: DISCONTINUED | OUTPATIENT
Start: 2023-01-24 | End: 2023-01-24

## 2023-01-24 RX ORDER — SODIUM CHLORIDE 0.9 % (FLUSH) 0.9 %
5-40 SYRINGE (ML) INJECTION EVERY 12 HOURS SCHEDULED
Status: DISCONTINUED | OUTPATIENT
Start: 2023-01-24 | End: 2023-01-27 | Stop reason: HOSPADM

## 2023-01-24 RX ADMIN — SODIUM CHLORIDE: 9 INJECTION, SOLUTION INTRAVENOUS at 20:35

## 2023-01-24 RX ADMIN — SODIUM CHLORIDE: 9 INJECTION, SOLUTION INTRAVENOUS at 10:07

## 2023-01-24 RX ADMIN — PROMETHAZINE HYDROCHLORIDE: 25 INJECTION INTRAMUSCULAR; INTRAVENOUS at 13:14

## 2023-01-24 RX ADMIN — INSULIN GLARGINE 48 UNITS: 100 INJECTION, SOLUTION SUBCUTANEOUS at 01:59

## 2023-01-24 RX ADMIN — ONDANSETRON 4 MG: 4 TABLET, ORALLY DISINTEGRATING ORAL at 01:22

## 2023-01-24 RX ADMIN — INSULIN GLARGINE 35 UNITS: 100 INJECTION, SOLUTION SUBCUTANEOUS at 20:57

## 2023-01-24 RX ADMIN — ONDANSETRON 4 MG: 2 INJECTION INTRAMUSCULAR; INTRAVENOUS at 15:27

## 2023-01-24 RX ADMIN — RANOLAZINE 500 MG: 500 TABLET, EXTENDED RELEASE ORAL at 20:57

## 2023-01-24 RX ADMIN — PANTOPRAZOLE SODIUM 40 MG: 40 TABLET, DELAYED RELEASE ORAL at 06:06

## 2023-01-24 RX ADMIN — RANOLAZINE 500 MG: 500 TABLET, EXTENDED RELEASE ORAL at 10:05

## 2023-01-24 RX ADMIN — ENOXAPARIN SODIUM 40 MG: 100 INJECTION SUBCUTANEOUS at 10:05

## 2023-01-24 RX ADMIN — CEFTRIAXONE SODIUM 1000 MG: 1 INJECTION, POWDER, FOR SOLUTION INTRAMUSCULAR; INTRAVENOUS at 21:04

## 2023-01-24 RX ADMIN — RANOLAZINE 500 MG: 500 TABLET, EXTENDED RELEASE ORAL at 01:55

## 2023-01-24 RX ADMIN — SODIUM CHLORIDE: 9 INJECTION, SOLUTION INTRAVENOUS at 01:07

## 2023-01-24 RX ADMIN — Medication 10 ML: at 13:53

## 2023-01-24 RX ADMIN — LISINOPRIL 20 MG: 20 TABLET ORAL at 10:05

## 2023-01-24 RX ADMIN — Medication 10 ML: at 21:08

## 2023-01-24 RX ADMIN — ATORVASTATIN CALCIUM 10 MG: 10 TABLET, FILM COATED ORAL at 20:57

## 2023-01-24 RX ADMIN — ONDANSETRON 4 MG: 2 INJECTION INTRAMUSCULAR; INTRAVENOUS at 09:02

## 2023-01-24 RX ADMIN — AMLODIPINE BESYLATE 5 MG: 5 TABLET ORAL at 10:05

## 2023-01-24 RX ADMIN — BUPRENORPHINE AND NALOXONE 3 FILM: 2; .5 FILM BUCCAL; SUBLINGUAL at 13:51

## 2023-01-24 RX ADMIN — VENLAFAXINE HYDROCHLORIDE 150 MG: 150 CAPSULE, EXTENDED RELEASE ORAL at 10:05

## 2023-01-24 ASSESSMENT — PAIN SCALES - GENERAL: PAINLEVEL_OUTOF10: 0

## 2023-01-24 NOTE — PROGRESS NOTES
Assessment completed. VSS.  at bedside. PRN zofran given d/t nausea. Emesis x1. Call light within reach. Fall precautions in place.

## 2023-01-24 NOTE — PLAN OF CARE
Problem: Safety - Adult  Goal: Free from fall injury  1/24/2023 1832 by Xavi Conn RN  Outcome: Progressing  1/24/2023 1830 by Xavi Conn RN  Outcome: Progressing  1/24/2023 0454 by EILEEN France  Outcome: Progressing     Problem: Chronic Conditions and Co-morbidities  Goal: Patient's chronic conditions and co-morbidity symptoms are monitored and maintained or improved  Outcome: Progressing     Problem: Gastrointestinal - Adult  Goal: Minimal or absence of nausea and vomiting  Outcome: Progressing  Goal: Maintains adequate nutritional intake  Outcome: Progressing     Problem: Metabolic/Fluid and Electrolytes - Adult  Goal: Electrolytes maintained within normal limits  Outcome: Progressing

## 2023-01-24 NOTE — PROGRESS NOTES
Pt requested that suboxone be given later as it makes her gag and she is still feeling nauseous.  Suboxone placed in pt's cubby in [facility] with paperwork

## 2023-01-24 NOTE — PROGRESS NOTES
4 Eyes Skin Assessment     NAME:  Enedelia Flores  YOB: 1955  MEDICAL RECORD NUMBER:  [id number]    The patient is being assessed for  Admission    I agree that One RN have performed a thorough Head to Toe Skin Assessment on the patient. ALL assessment sites listed below have been assessed. Areas assessed by both nurses:    Head, Face, Ears, Shoulders, Back, Chest, Arms, Elbows, Hands, Sacrum. Buttock, Coccyx, Ischium, and Legs. Feet and Heels        Does the Patient have a Wound?  No noted wound(s)       Himanshu Prevention initiated by RN: Yes   Wound Care Orders initiated by RN: No    Pressure Injury (Stage 3,4, Unstageable, DTI, NWPT, and Complex wounds) if present place referral order by RN under : No    New and Established Ostomies, if present place, referral order under : No      Nurse 1 eSignature: Electronically signed by Tacho Dunn on 1/24/23 at 4:21 AM EST    **SHARE this note so that the co-signing nurse is able to place an eSignature**    Nurse 2 eSignature: Electronically signed by Milady Kemp RN on 1/24/23 at 4:36 AM EST

## 2023-01-24 NOTE — H&P
Hospital Medicine History & Physical      PCP: [id number]    Date of Admission: 1/23/2023    Date of Service: Pt seen/examined on 1/23/2023  and Admitted to Inpatient with expected LOS greater than two midnights due to medical therapy. Chief Complaint:    Chief Complaint   Patient presents with    Fatigue     Pt states that she was in the hospital and released on Friday. Pt states that she is still having nausea no vomiting, feels weak. Pt states that she talked to Dr. Colt Bowen and he told her to come back up to the hospital.         History Of Present Illness: The patient is a 79 y.o. female with history of type 2 diabetes with gastroparesis, hyperlipidemia, anxiety, recently discharged from hospital for intractable nausea and vomiting secondary to gastroparesis which responded to Reglan and scopolamine patch. She had an EGD at that time noted for gastritis and was treated for UTI. She returns with altered mentation for the last 5 days with increased agitation and confusion and becoming combative. At baseline patient alert and oriented. No significant vomiting this time. CT brain was unremarkable  Chest x-ray with clear lung fields  Urinalysis was positive  EKG sinus rhythm    Past Medical History:        Diagnosis Date    Anxiety     Diabetes mellitus ([address]     Hyperlipidemia     MVP (mitral valve prolapse)     Neck pain        Past Surgical History:        Procedure Laterality Date    CHOLECYSTECTOMY      HYSTERECTOMY (CERVIX STATUS UNKNOWN)      UPPER GASTROINTESTINAL ENDOSCOPY N/A 1/11/2023    EGD BIOPSY performed by Himanshu Dozier MD at [address] [place] [address]       Medications Prior to Admission:    Prior to Admission medications    Medication Sig Start Date End Date Taking?  Authorizing Provider   scopolamine (TRANSDERM-SCOP) transdermal patch Place 1 patch onto the skin every 72 hours 1/13/23 2/12/23  Isamar Machado PA-C   promethazine (PHENERGAN) 25 MG suppository Place 1 suppository rectally every 6 hours as needed for Nausea 1/13/23 2/12/23  Conrad Jaramillo PA-C   amLODIPine (NORVASC) 5 MG tablet Take 1 tablet by mouth daily 1/14/23   Conrad Jaramillo PA-C   metoclopramide (REGLAN) 5 MG tablet Take three times daily before meals as needed for nausea 1/13/23   Cnorad Jaramillo PA-C   pantoprazole (PROTONIX) 40 MG tablet Take 1 tablet by mouth daily 1/13/23   Conrad Jaramillo PA-C   TRESIBA FLEXTOUCH 200 UNIT/ML SOPN Inject 110 Units into the skin at bedtime 1/6/23   Historical Provider, MD   lisinopril (PRINIVIL;ZESTRIL) 20 MG tablet Take 20 mg by mouth daily 12/7/22   Historical Provider, MD   ondansetron (ZOFRAN-ODT) 4 MG disintegrating tablet Place 4 mg under the tongue every 8 hours as needed for Nausea or Vomiting 12/20/22   Historical Provider, MD   pregabalin (LYRICA) 75 MG capsule Take 75 mg by mouth 2 times daily. 11/3/22   Historical Provider, MD   ranolazine (RANEXA) 500 MG extended release tablet Take 500 mg by mouth 2 times daily 12/29/22   Historical Provider, MD   simvastatin (ZOCOR) 20 MG tablet Take 20 mg by mouth nightly 12/7/22   Historical Provider, MD   NOVOLOG FLEXPEN 100 UNIT/ML injection pen Inject 6-40 Units into the skin 3 times daily (before meals) SSI (BG-110)/4 = # of units to give 12/8/22   Historical Provider, MD   buprenorphine-naloxone (SUBOXONE) 4-1 MG FILM SL film Place 1.5 Film under the tongue daily. Historical Provider, MD   venlafaxine (EFFEXOR XR) 150 MG extended release capsule Take 150 mg by mouth daily 5/19/22   Historical Provider, MD   metformin (GLUCOPHAGE) 1000 MG tablet Take 1,000 mg by mouth 2 times daily (with meals). Historical Provider, MD       Allergies:  Patient has no known allergies. Social History:  The patient currently lives with family    TOBACCO:   reports that she has never smoked. She does not have any smokeless tobacco history on file. ETOH:   reports no history of alcohol use.       Family History:  Reviewed in detail and negative for DM, Early CAD, Cancer, CVA. Positive as follows:    History reviewed. No pertinent family history. REVIEW OF SYSTEMS:   Positive for altered mentation, worsening confusion and as noted in the HPI. All other systems reviewed and negative. PHYSICAL EXAM:    /75   Pulse 96   Temp 98.8 °F (37.1 °C)   Resp 20   SpO2 94%     General appearance: No apparent distress appears stated age and cooperative. HEENT Normal cephalic, atraumatic without obvious deformity. Pupils equal, round, and reactive to light. Extra ocular muscles intact. Conjunctivae/corneas clear. Neck: Supple, No jugular venous distention/bruits. Lungs: Clear to auscultation, bilaterally without Rales/Wheezes  Heart: Regular rate and rhythm with Normal S1/S2 without murmurs, rubs or gallops  Abdomen: Soft, non-tender or non-distended without rigidity or guarding and positive bowel sounds   Extremities: No clubbing, cyanosis, or edema bilaterally. Skin: Skin color, texture, turgor normal.  No rashes or lesions. Neurologic: Alert and oriented X 3, neurovascularly intact with sensory/motor intact upper extremities/lower extremities, bilaterally. Cranial nerves: II-XII intact, grossly non-focal.  Mental status: Alert, oriented, thought content appropriate. CBC   Recent Labs     01/23/23 1913   WBC 7.3   HGB 11.9*   HCT 37.0         RENAL  Recent Labs     01/23/23 1913      K 4.6      CO2 27   BUN 10   CREATININE 0.8     LFT'S  Recent Labs     01/23/23 1913   AST 21   ALT 11   BILITOT 0.5   ALKPHOS 108     COAG  No results for input(s): INR in the last 72 hours.   CARDIAC ENZYMES  Recent Labs     01/23/23 1913   TROPONINI <0.01       U/A:    Lab Results   Component Value Date/Time    COLORU Yellow 01/23/2023 07:18 PM    WBCUA 240 01/23/2023 07:18 PM    RBCUA 1 01/23/2023 07:18 PM    BACTERIA 4+ 01/23/2023 07:18 PM    CLARITYU CLOUDY 01/23/2023 07:18 PM    SPECGRAV 1.010 01/23/2023 07:18 PM    LEUKOCYTESUR LARGE 01/23/2023 07:18 PM    BLOODU TRACE 01/23/2023 07:18 PM    GLUCOSEU Negative 01/23/2023 07:18 PM         Active Hospital Problems    Diagnosis Date Noted    Acute metabolic encephalopathy [Q51.51] 01/23/2023     Priority: Medium    Agitation [R45.1] 01/23/2023     Priority: Medium    Probable Medication adverse effect [T50.905A] 01/23/2023     Priority: Medium    Type 2 diabetes mellitus ([address] [E11.9] 01/23/2023     Priority: Medium    HLD (hyperlipidemia) [E78.5] 01/23/2023     Priority: Medium    UTI (urinary tract infection) [N39.0] 01/09/2023     Priority: Medium         ASSESSMENT/PLAN:  79 y.o. female with history of type 2 diabetes with gastroparesis, hyperlipidemia, anxiety, recently discharged from hospital for intractable nausea and vomiting secondary to gastroparesis which responded to Reglan and scopolamine patch who presents with altered mentation and increased agitation with confusion and combativeness. Urinalysis was positive suggestive of UTI. However she has been on metoclopramide for gastroparesis and I wonder if she may be having side effects from metoclopramide. Plan:  - Continue to hold Reglan  - Continue on IV antibiotics with ceftriaxone  - Monitor mentation and orientate daily  - Continue on scopolamine patch with as needed Zofran for nausea and vomiting  - Sliding scale insulin for glycemic control with basal bolus glargine  --Continue other chronic home medication      DVT Prophylaxis: Subcut enoxaparin  Diet: Diabetic diet  Code Status: Full code         Elaine Landaverde MD    Thank you Rashard Manzanares for the opportunity to be involved in this patient's care. If you have any questions or concerns please feel free to contact me at [phone].

## 2023-01-24 NOTE — PROGRESS NOTES
[facility]    1/24/2023 2:50 PM        Name: Michael German . Admitted: 1/23/2023  Primary Care Provider: Caridad Oliveira (Tel: [phone])      Subjective:  . Patient admitted with confusion/aggression. I am familiar with this patient as I saw her during previous admission at that time she was admitted with UTI and nausea and vomiting. Nausea and vomiting has been occurring Lamine early December. 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 She weaned off of this and then started going to [address] [place] [address] and has been on Suboxone. She is also on marijuana Gummies. There is been concern for gastroparesis however patient could not complete an outpatient gastric emptying scan due to vomiting. She also has a history of hiatal hernia which has been repaired and fixed twice. She had an EGD during the previous admission which revealed some antral gastritis. Her symptoms resolved on a scopolamine patch. She was also started on Reglan 10 mg however she was discharged on 5 mg 3 times daily as needed however her  who is at bedside states that she was taking it scheduled. She was discharged on Friday the 13th and started having confusion the following Wednesday. He removed her patch and stop the Reglan Thursday night. She was no better by Monday. In the emergency room UA is suggestive of possible UTI once again. Previous cultures were positive for pansensitive E. coli. She had completed a course of antibiotics during her stay the last time. At this time the patient is alert and oriented however she had appears tired. She did vomit this morning. When the patient was on scopolamine and Reglan she did not have any nausea or vomiting at home. Family reports patient has had a brownish drainage in her underwear for a while.     Reviewed interval ancillary notes    Current Medications  amLODIPine (NORVASC) tablet 5 mg, Daily  lisinopril (PRINIVIL;ZESTRIL) tablet 20 mg, Daily  buprenorphine-naloxone (SUBOXONE) 2-0.5 MG SL film 3 Film, Daily  pantoprazole (PROTONIX) tablet 40 mg, QAM AC  ranolazine (RANEXA) extended release tablet 500 mg, BID  venlafaxine (EFFEXOR XR) extended release capsule 150 mg, Daily  dextrose bolus 10% 125 mL, PRN   Or  dextrose bolus 10% 250 mL, PRN  glucagon (rDNA) injection 1 mg, PRN  dextrose 10 % infusion, Continuous PRN  sodium chloride flush 0.9 % injection 5-40 mL, 2 times per day  sodium chloride flush 0.9 % injection 5-40 mL, PRN  0.9 % sodium chloride infusion, PRN  enoxaparin (LOVENOX) injection 40 mg, Daily  ondansetron (ZOFRAN-ODT) disintegrating tablet 4 mg, Q8H PRN   Or  ondansetron (ZOFRAN) injection 4 mg, Q6H PRN  acetaminophen (TYLENOL) tablet 650 mg, Q6H PRN   Or  acetaminophen (TYLENOL) suppository 650 mg, Q6H PRN  polyethylene glycol (GLYCOLAX) packet 17 g, Daily PRN  insulin lispro (HUMALOG) injection vial 0-8 Units, TID WC  insulin lispro (HUMALOG) injection vial 0-4 Units, Nightly  cefTRIAXone (ROCEPHIN) 1,000 mg in dextrose 5 % 50 mL IVPB mini-bag, Q24H  atorvastatin (LIPITOR) tablet 10 mg, Nightly  insulin glargine (LANTUS) injection vial 48 Units, Nightly  0.9 % sodium chloride infusion, Continuous  promethazine (PHENERGAN) 6.25 mg in sodium chloride 0.9 % 50 mL IVPB, Q6H PRN  scopolamine (TRANSDERM-SCOP) transdermal patch 1 patch, Q72H        Objective:  BP (!) 154/89   Pulse 93   Temp 98.4 °F (36.9 °C) (Oral)   Resp 18   Ht 5' 6\" (1.676 m)   Wt 240 lb (108.9 kg)   SpO2 90%   BMI 38.74 kg/m²   No intake or output data in the 24 hours ending 01/24/23 1450   Wt Readings from Last 3 Encounters:   01/24/23 240 lb (108.9 kg)   01/09/23 240 lb (108.9 kg)       General appearance:  Appears comfortable  Eyes: Sclera clear. Pupils equal.  ENT: Moist oral mucosa. Trachea midline, no adenopathy.   Cardiovascular: Regular rhythm, normal S1, S2. No murmur. No edema in lower extremities  Respiratory: Not using accessory muscles. Good inspiratory effort. Clear to auscultation bilaterally, no wheeze or crackles. GI: Abdomen soft, no tenderness, not distended, normal bowel sounds  Musculoskeletal: No cyanosis in digits, neck supple  Neurology: CN 2-12 grossly intact. No speech or motor deficits  Psych: Normal affect. Alert and oriented in time, place and person  Skin: Warm, dry, normal turgor    Labs and Tests:  CBC:   Recent Labs     01/23/23 1913 01/24/23  0549   WBC 7.3 6.7   HGB 11.9* 11.6*    325     BMP:    Recent Labs     01/23/23 1913 01/24/23  0549    144   K 4.6 4.2    105   CO2 27 30   BUN 10 9   CREATININE 0.8 0.8   GLUCOSE 233* 139*     Hepatic:   Recent Labs     01/23/23 1913   AST 21   ALT 11   BILITOT 0.5   ALKPHOS 108       Problem List  Principal Problem:    Acute metabolic encephalopathy  Active Problems:    UTI (urinary tract infection)    Agitation    Probable Medication adverse effect    Type 2 diabetes mellitus (HCC)    HLD (hyperlipidemia)  Resolved Problems:    * No resolved hospital problems. *       Assessment & Plan:   Metabolic encephalopathy-at this stage it is not clear what the etiology of this is. During her recent hospitalization she did not have any confusion whatsoever. She had been on Reglan very briefly during the hospitalization and did not have any confusion even with the positive urine cultures. It is possible that it is a combination of all of these things combined including the UTI along with the Reglan and the scopolamine patch (also on suboxone and marijuana gummies). Her symptoms however did persist 5 days beyond removal of the scopolamine patch and discontinuation of the Reglan. Patient does seem better today. We will continue to monitor. UTI-previous cultures positive for E. coli. We will continue Rocephin pending culture results.   We will likely refer to urology as an outpatient given recurrence. Question chronic colonization in the absence of fever or leukocytosis. Chronic nausea and vomiting-GI has seen during previous admission. It was suspected that etiology may be gastroparesis versus recurrence of hiatal hernia. She will need a gastric emptying scan at some point. We will try resuming scopolamine patch only. DM 2-a1c 6.8. Currently on 48 of lantus and SSI. Sugars acceptable however will decrease lantus for now as she has been vomiting. Diet: ADULT DIET;  Regular; 5 carb choices (75 gm/meal)  Code:Full Code  DVT PPX: lovenox      Aster Delgado PA-C   1/24/2023 2:50 PM

## 2023-01-24 NOTE — PROGRESS NOTES
Family requested that dinner vitals not be done d/t pt finally getting to rest. Blood sugar 179. Does not appear to be in any distress. Resting comfortably.

## 2023-01-24 NOTE — ED PROVIDER NOTES
[facility]        Pt Name: Joseph Elkins  MRN: [id number]  Katharinegfalessandro 1955  Date of evaluation: 1/23/2023  Provider: Taya Zuniga PA-C  PCP: Kyle MCFARLANELBURT  Note Started: 9:07 PM EST 1/23/23       I have seen and evaluated this patient with my supervising physician Rusty Torres MD.      [address]       Chief Complaint   Patient presents with    Fatigue     Pt states that she was in the hospital and released on Friday. Pt states that she is still having nausea no vomiting, feels weak. Pt states that she talked to Dr. Jorge Bishop and he told her to come back up to the hospital.        HISTORY OF PRESENT ILLNESS: 1 or more Elements     History From: Patient,   Limitations to history : None    Joseph Elkins is a 79 y.o. female who presents to the emergency department today for evaluation for agitation, and intermittent confusion. The patient was recently admitted at the beginning of the month, and was discharged over 10 days ago. Patient was doing well for approximately 4 days or so. 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 The  reports that the patient is just saying \"off the wall things\". The  reports that the patient was very agitated, and angry all throughout the night last night, he states that she was confused when trying to take medications. Today he reports that the patient seems to be calm and back at her baseline, and has not had any aggressive behavior today. Her has been reports that the patient has had intermittent episodes of slurred speech throughout the past 3 to 4 days, however does not notice any slurred speech at this time.   The patient arrives to the ED she is alert and oriented x3, she is calm, cooperative. She tells me that she is just really not been feeling well and has been feeling tired. She has not any fever or chills. No cough or congestion. No chest pain or shortness of breath. She has no dysuria or hematuria however was admitted previously for UTI. Patient has no abdominal pain. No flank pain. Patient has had some nausea however is not had any vomiting or diarrhea    Nursing Notes were all reviewed and agreed with or any disagreements were addressed in the HPI. REVIEW OF SYSTEMS :      Review of Systems   Constitutional:  Negative for activity change, appetite change, chills and fever. HENT:  Negative for congestion and rhinorrhea. Respiratory:  Negative for cough and shortness of breath. Cardiovascular:  Negative for chest pain. Gastrointestinal:  Negative for abdominal pain, diarrhea, nausea and vomiting. Genitourinary:  Negative for difficulty urinating, dysuria and hematuria. Psychiatric/Behavioral:  Positive for agitation and confusion. Positives and Pertinent negatives as per HPI. SURGICAL HISTORY     Past Surgical History:   Procedure Laterality Date    CHOLECYSTECTOMY      HYSTERECTOMY (CERVIX STATUS UNKNOWN)      UPPER GASTROINTESTINAL ENDOSCOPY N/A 1/11/2023    EGD BIOPSY performed by Edna Galo MD at [address] [place] [address]       Previous Medications    AMLODIPINE (NORVASC) 5 MG TABLET    Take 1 tablet by mouth daily    BUPRENORPHINE-NALOXONE (SUBOXONE) 4-1 MG FILM SL FILM    Place 1.5 Film under the tongue daily. LISINOPRIL (PRINIVIL;ZESTRIL) 20 MG TABLET    Take 20 mg by mouth daily    METFORMIN (GLUCOPHAGE) 1000 MG TABLET    Take 1,000 mg by mouth 2 times daily (with meals).     METOCLOPRAMIDE (REGLAN) 5 MG TABLET    Take three times daily before meals as needed for nausea    NOVOLOG FLEXPEN 100 UNIT/ML INJECTION PEN    Inject 6-40 Units into the skin 3 times daily (before meals) SSI (BG-110)/4 = # of units to give    ONDANSETRON (ZOFRAN-ODT) 4 MG DISINTEGRATING TABLET    Place 4 mg under the tongue every 8 hours as needed for Nausea or Vomiting    PANTOPRAZOLE (PROTONIX) 40 MG TABLET    Take 1 tablet by mouth daily    PREGABALIN (LYRICA) 75 MG CAPSULE    Take 75 mg by mouth 2 times daily. PROMETHAZINE (PHENERGAN) 25 MG SUPPOSITORY    Place 1 suppository rectally every 6 hours as needed for Nausea    RANOLAZINE (RANEXA) 500 MG EXTENDED RELEASE TABLET    Take 500 mg by mouth 2 times daily    SCOPOLAMINE (TRANSDERM-SCOP) TRANSDERMAL PATCH    Place 1 patch onto the skin every 72 hours    SIMVASTATIN (ZOCOR) 20 MG TABLET    Take 20 mg by mouth nightly    TRESIBA FLEXTOUCH 200 UNIT/ML SOPN    Inject 110 Units into the skin at bedtime    VENLAFAXINE (EFFEXOR XR) 150 MG EXTENDED RELEASE CAPSULE    Take 150 mg by mouth daily       ALLERGIES     Patient has no known allergies. FAMILYHISTORY     History reviewed. No pertinent family history. SOCIAL HISTORY       Social History     Tobacco Use    Smoking status: Never   Substance Use Topics    Alcohol use: No    Drug use: No     Comment: CBD gummies for pain       SCREENINGS        [place] Coma Scale  Eye Opening: Spontaneous  Best Verbal Response: Oriented  Best Motor Response: Obeys commands  Jose Coma Scale Score: 15                CIWA Assessment  BP: 129/75  Heart Rate: 96           PHYSICAL EXAM  1 or more Elements     ED Triage Vitals   BP Temp Temp Source Heart Rate Resp SpO2 Height Weight   01/23/23 1818 01/23/23 1824 01/23/23 1818 01/23/23 1818 01/23/23 1818 01/23/23 1818 -- --   129/75 98.8 °F (37.1 °C) Oral 96 20 94 %         Physical Exam  Vitals and nursing note reviewed. Constitutional:       Appearance: She is well-developed. She is not diaphoretic. HENT:      Head: Normocephalic and atraumatic.       Right Ear: External ear normal.      Left Ear: External ear normal.      Nose: Nose normal.      Mouth/Throat:      Mouth: Mucous membranes are moist.      Pharynx: Oropharynx is clear. No posterior oropharyngeal erythema. Eyes:      General:         Right eye: No discharge. Left eye: No discharge. Extraocular Movements: Extraocular movements intact. Conjunctiva/sclera: Conjunctivae normal.      Pupils: Pupils are equal, round, and reactive to light. Neck:      Trachea: No tracheal deviation. Cardiovascular:      Rate and Rhythm: Normal rate and regular rhythm. Heart sounds: No murmur heard. Pulmonary:      Effort: Pulmonary effort is normal. No respiratory distress. Breath sounds: Normal breath sounds. No wheezing or rales. Abdominal:      General: Bowel sounds are normal. There is no distension. Palpations: Abdomen is soft. Tenderness: There is no abdominal tenderness. There is no guarding. Musculoskeletal:         General: Normal range of motion. Cervical back: Normal range of motion and neck supple. Skin:     General: Skin is warm and dry. Neurological:      General: No focal deficit present. Mental Status: She is alert and oriented to person, place, and time. GCS: GCS eye subscore is 4. GCS verbal subscore is 5. GCS motor subscore is 6. Cranial Nerves: Cranial nerves 2-12 are intact. Sensory: Sensation is intact. Motor: Motor function is intact. Coordination: Coordination is intact. Gait: Gait is intact.    Psychiatric:         Behavior: Behavior normal.           DIAGNOSTIC RESULTS   LABS:    Labs Reviewed   CBC WITH AUTO DIFFERENTIAL - Abnormal; Notable for the following components:       Result Value    Hemoglobin 11.9 (*)     All other components within normal limits   COMPREHENSIVE METABOLIC PANEL - Abnormal; Notable for the following components:    Glucose 233 (*)     All other components within normal limits   LIPASE - Abnormal; Notable for the following components:    Lipase 6.0 (*)     All other components within normal limits URINALYSIS WITH REFLEX TO CULTURE - Abnormal; Notable for the following components:    Clarity, UA CLOUDY (*)     Blood, Urine TRACE (*)     Nitrite, Urine POSITIVE (*)     Leukocyte Esterase, Urine LARGE (*)     All other components within normal limits   BRAIN NATRIURETIC PEPTIDE - Abnormal; Notable for the following components:    Pro- (*)     All other components within normal limits   BLOOD GAS, VENOUS - Abnormal; Notable for the following components:    pO2, [place] 91.9 (*)     Carboxyhemoglobin 2.2 (*)     All other components within normal limits   MICROSCOPIC URINALYSIS - Abnormal; Notable for the following components:    Bacteria, UA 4+ (*)     WBC,  (*)     Epithelial Cells, UA 12 (*)     All other components within normal limits   CULTURE, URINE   TROPONIN   PROCALCITONIN   LACTATE, SEPSIS   LACTATE, SEPSIS       When ordered only abnormal lab results are displayed. All other labs were within normal range or not returned as of this dictation. EKG: When ordered, EKG's are interpreted by the Emergency Department Physician in the absence of a cardiologist.  Please see their note for interpretation of EKG. RADIOLOGY:   Non-plain film images such as CT, Ultrasound and MRI are read by the radiologist. Plain radiographic images are visualized and preliminarily interpreted by the ED Provider with the below findings:        Interpretation per the Radiologist below, if available at the time of this note:    CT HEAD WO CONTRAST   Final Result   No acute intracranial abnormality. XR CHEST PORTABLE   Final Result   No acute cardiopulmonary process.            CT HEAD WO CONTRAST    Result Date: 1/23/2023  EXAMINATION: CT OF THE HEAD WITHOUT CONTRAST  1/23/2023 7:27 pm TECHNIQUE: CT of the head was performed without the administration of intravenous contrast. Automated exposure control, iterative reconstruction, and/or weight based adjustment of the mA/kV was utilized to reduce the radiation dose to as low as reasonably achievable. COMPARISON: None. HISTORY: ORDERING SYSTEM PROVIDED HISTORY: confusion TECHNOLOGIST PROVIDED HISTORY: Reason for exam:->confusion Has a \"code stroke\" or \"stroke alert\" been called? ->No Decision Support Exception - unselect if not a suspected or confirmed emergency medical condition->Emergency Medical Condition (MA) Reason for Exam: confusion Relevant Medical/Surgical History: Fatigue (Pt states that she was in the hospital and released on Friday. Pt states that she is still having nausea no vomiting, feels weak. Pt states that she talked to Dr. Arun New and he told her to come back up to the hospital. ) FINDINGS: BRAIN/VENTRICLES: There is no acute intracranial hemorrhage, mass effect or midline shift. No abnormal extra-axial fluid collection. The gray-white differentiation is maintained without evidence of an acute infarct. There is no evidence of hydrocephalus. ORBITS: The visualized portion of the orbits demonstrate no acute abnormality. SINUSES: The visualized paranasal sinuses and mastoid air cells demonstrate no acute abnormality. SOFT TISSUES/SKULL:  No acute abnormality of the visualized skull or soft tissues. No acute intracranial abnormality. XR CHEST PORTABLE    Result Date: 1/23/2023  EXAMINATION: ONE XRAY VIEW OF THE CHEST 1/23/2023 7:20 pm COMPARISON: None. HISTORY: ORDERING SYSTEM PROVIDED HISTORY: nausea TECHNOLOGIST PROVIDED HISTORY: Port per EW Reason for exam:->nausea Reason for Exam: nausea FINDINGS: There is no acute consolidation or effusion. There is no pneumothorax. The mediastinal structures are unremarkable. The upper abdomen is unremarkable. The extrathoracic soft tissues are unremarkable. There is no acute osseous abnormality. No acute cardiopulmonary process. No results found.     PROCEDURES   Unless otherwise noted below, none     Procedures    CRITICAL CARE TIME (.cctime)       PAST MEDICAL HISTORY      has a past medical history of Anxiety, Diabetes mellitus ([address] Hyperlipidemia, MVP (mitral valve prolapse), and Neck pain. EMERGENCY DEPARTMENT COURSE and DIFFERENTIAL DIAGNOSIS/MDM:   Vitals:    Vitals:    01/23/23 1818 01/23/23 1824   BP: 129/75    Pulse: 96    Resp: 20    Temp:  98.8 °F (37.1 °C)   TempSrc: Oral    SpO2: 94%        Patient was given the following medications:  Medications   cefTRIAXone (ROCEPHIN) 1,000 mg in dextrose 5 % 50 mL IVPB mini-bag (has no administration in time range)             Is this patient to be included in the SEP-1 Core Measure due to severe sepsis or septic shock? No   Exclusion criteria - the patient is NOT to be included for SEP-1 Core Measure due to:  2+ SIRS criteria are not met    Chronic Conditions affecting care:  has a past medical history of Anxiety, Diabetes mellitus ([address] Hyperlipidemia, MVP (mitral valve prolapse), and Neck pain. CONSULTS: (Who and What was discussed)  None      Social Determinants : None    Records Reviewed (Source): Previous admission record on 1/9/2023, previous EGD result    CC/HPI Summary, DDx, ED Course, and Reassessment: Briefly, this is a 80-year-old female who presents to the emergency department today for evaluation for agitation, and intermittent episodes of confusion patient was admitted earlier this month for nausea, patient previously was on Reglan however has been off of this for the past 4 days as initially thought that this could be causing her agitation, and confusion. Patient continues to have agitation, and confusion, has been brought the patient to the ED. On examination, there are no focal neurological deficits, and a 2 0 she is alert and oriented x3, she is calm and cooperative.     Disposition Considerations (tests considered but not done, Admit vs D/C, Shared Decision Making, Pt Expectation of Test or Tx.):    And reviewed the patient's her, she was previously diagnosed with a UTI, however  states that they were not given antibiotics no stroke alert was called as the patient's NIH is 0, she is at her baseline. Symptoms have also been ongoing for the past 4 days. EKG seconded by my attending, please see his note for further details. CBC shows no evidence of leukocytosis, or anemia. CMP shows glucose of 233 however there is no anion gap and CO2 is normal.  Troponin is negative. Lactic acid is normal, procalcitonin is normal.  CT of head is negative, chest x-ray is negative. Urine does show evidence of infection with 240 white blood cells, will treat with Rocephin as culture was previously sensitive to this. Patient likely has acute delirium from her UTI, and feel would benefit from admission, hospitalist has been paged for admission, patient and  are updated, agreeable plan, stable for admission    I am the Primary Clinician of Record. FINAL IMPRESSION      1. Acute delirium    2. Acute cystitis without hematuria          DISPOSITION/PLAN     DISPOSITION Decision To Admit 01/23/2023 08:19:46 PM      PATIENT REFERRED TO:  No follow-up provider specified.     DISCHARGE MEDICATIONS:  New Prescriptions    No medications on file       DISCONTINUED MEDICATIONS:  Discontinued Medications    No medications on file              (Please note that portions of this note were completed with a voice recognition program.  Efforts were made to edit the dictations but occasionally words are mis-transcribed.)    Luis Enrique Ni PA-C (electronically signed)        Luis Enrique Ni PA-C  01/23/23 5527

## 2023-01-24 NOTE — PROGRESS NOTES
Pt admitted to room 5568. VSS. Assessment completed Schedule meds given. Pt denies pain and is able to walk independently to the bathroom. Bed in position and call light within reach.

## 2023-01-24 NOTE — ED PROVIDER NOTES
I independently saw performed a substantive portion of the visit (history, physical, and MDM) on Adriane Ferro. All diagnostic, treatment, and disposition decisions were made by myself in conjunction with the advanced practice provider. I have participated in the medical decision making and directed the treatment plan and disposition of the patient. For further details of [facility] emergency department encounter, please see the advanced practice provider's documentation. Jolene Rueda MD, am the primary physician provider of record. CHIEF COMPLAINT  Chief Complaint   Patient presents with    Fatigue     Pt states that she was in the hospital and released on Friday. Pt states that she is still having nausea no vomiting, feels weak. Pt states that she talked to Dr. Micki Byrne and he told her to come back up to the hospital.        Briefly, Adriane Ferro is a 79 y.o. female  who presents to the ED complaining of recent admission to the hospital however since then has had generalized malaise and fatigue and some issues with confusion and disorientation which is not her normal self. She was given antibiotics for a UTI while in the hospital last time however symptoms have persisted since he has been home. She denies any falls or head injury. No fevers. No belly pain. Family notes multiple examples where she has been forgetful of things that she has just completed or is doing actively as well as some paranoid delusions intermittently. FOCUSED PHYSICAL EXAMINATION  /75   Pulse 96   Temp 98.8 °F (37.1 °C)   Resp 20   SpO2 94%    Focused physical examination notable for no acute distress, well-appearing, well-nourished, normal speech and seems somewhat confused but not disoriented without obvious facial droop, no obvious rash. No obvious cranial nerve deficits on my initial exam.  Regular rate and rhythm clear to auscultation bilaterally abdomen soft nontender nondistended.   No focal motor or sensory deficits in the extremities. EKG performed in ED:  The 12 lead EKG was interpreted by me independent of a cardiologist as follows:  Rate: normal with a rate of 92  Rhythm: sinus  Axis: left deviation  Intervals: normal [place], narrow QRS, normal QTc  ST segments: no ST elevations or depressions  T waves: no abnormal inversions  Non-specific T wave changes: not present  Prior EKG comparison: EKG dated 1/12/23 is not significantly different        ED COURSE/MDM  Patient seen and evaluated. Old records reviewed. Labs and imaging reviewed. After initial evaluation, differential diagnostic considerations included but not limited to: stroke, TIA, intracranial bleed, trauma, infection/sepsis, medication side effect, intoxication/withdrawal, metabolic/electrolyte abnormalities    The patient's ED workup was notable for what appears to be acute confusion/delirium potentially related to her urinary tract infection which is profound today although is not septic or showing any signs of metabolic derangement otherwise. The patient will be given IV antibiotics and admitted to the hospital pending urine culture. Head CT today is nonacute and she has no focal deficits to suggest a stroke as the cause of her symptoms or anything else such as a brain bleed which has been definitively ruled out. Outpatient management was considered however I do feel that with the patient having encephalopathy as a complaint/symptom that admission is more appropriate. Is this patient to be included in the SEP-1 Core Measure?   No   Exclusion criteria - the patient is NOT to be included for SEP-1 Core Measure due to:  2+ SIRS criteria are not met      Consults:  None    History obtained from: Patient and Family (daughter and significant other)    Pertinent social determinants of health: None applicable    Review of other records:  Inpatient notes from previous visit at this facility from 1/9/23 to 1/13/23    Reassessment:  See MDM for details of medications given and reassessment    During the patient's ED course, the patient was given:  Medications   cefTRIAXone (ROCEPHIN) 1,000 mg in dextrose 5 % 50 mL IVPB mini-bag (1,000 mg IntraVENous New Bag 1/23/23 2123)        CLINICAL IMPRESSION  1. Acute delirium    2. Acute cystitis without hematuria        Blood pressure 129/75, pulse 96, temperature 98.8 °F (37.1 °C), resp. rate 20, SpO2 94 %. DISPOSITION  Alexander Carbone was admitted in fair condition. The plan is to admit to the hospital at this time under the hospitalist service. Hospitalist accepted the patient and will take over the patient's care. Critical care time:  None    DISCLAIMER: This chart was created using Dragon dictation software. Efforts were made by me to ensure accuracy, however some errors may be present due to limitations of this technology and occasionally words are not transcribed correctly.        Ann Garcia MD  01/23/23 3243

## 2023-01-24 NOTE — CARE COORDINATION
01/24/23 0903   Readmission Assessment   Number of Days since last admission? 8-30 days   Previous Disposition Home with Family   Who is being Interviewed Patient;Caregiver   What was the patient's/caregiver's perception as to why they think they needed to return back to the hospital? Other (Comment)  (Patient reports she has had increased fatigue and confusion since her discharge. Spouse also reports patient has seemed more confused the past few days prior to returning to the ER.)   Did you visit your Primary Care Physician after you left the hospital, before you returned this time? No   Why weren't you able to visit your PCP? Did not have an appointment   Did you see a specialist, such as Cardiac, Pulmonary, Orthopedic Physician, etc. after you left the hospital? No   Who advised the patient to return to the hospital? Physician   Does the patient report anything that got in the way of taking their medications? No   In our efforts to provide the best possible care to you and others like you, can you think of anything that we could have done to help you after you left the hospital the first time, so that you might not have needed to return so soon?  Other (Comment)  (Nothing reported)

## 2023-01-25 LAB
ESTIMATED AVERAGE GLUCOSE: 145.6 MG/DL
GLUCOSE BLD-MCNC: 106 MG/DL (ref 70–99)
GLUCOSE BLD-MCNC: 143 MG/DL (ref 70–99)
GLUCOSE BLD-MCNC: 149 MG/DL (ref 70–99)
GLUCOSE BLD-MCNC: 199 MG/DL (ref 70–99)
HBA1C MFR BLD: 6.7 %
PERFORMED ON: ABNORMAL

## 2023-01-25 PROCEDURE — C1751 CATH, INF, PER/CENT/MIDLINE: HCPCS

## 2023-01-25 PROCEDURE — 2580000003 HC RX 258: Performed by: PHYSICIAN ASSISTANT

## 2023-01-25 PROCEDURE — 02HV33Z INSERTION OF INFUSION DEVICE INTO SUPERIOR VENA CAVA, PERCUTANEOUS APPROACH: ICD-10-PCS | Performed by: INTERNAL MEDICINE

## 2023-01-25 PROCEDURE — 1200000000 HC SEMI PRIVATE

## 2023-01-25 PROCEDURE — 6370000000 HC RX 637 (ALT 250 FOR IP): Performed by: INTERNAL MEDICINE

## 2023-01-25 PROCEDURE — 2580000003 HC RX 258: Performed by: INTERNAL MEDICINE

## 2023-01-25 PROCEDURE — 36569 INSJ PICC 5 YR+ W/O IMAGING: CPT

## 2023-01-25 PROCEDURE — 6370000000 HC RX 637 (ALT 250 FOR IP): Performed by: PHYSICIAN ASSISTANT

## 2023-01-25 PROCEDURE — 6360000002 HC RX W HCPCS: Performed by: PHYSICIAN ASSISTANT

## 2023-01-25 PROCEDURE — 6360000002 HC RX W HCPCS: Performed by: INTERNAL MEDICINE

## 2023-01-25 RX ORDER — LIDOCAINE HYDROCHLORIDE 10 MG/ML
5 INJECTION, SOLUTION EPIDURAL; INFILTRATION; INTRACAUDAL; PERINEURAL ONCE
Status: DISCONTINUED | OUTPATIENT
Start: 2023-01-25 | End: 2023-01-27 | Stop reason: HOSPADM

## 2023-01-25 RX ORDER — SODIUM CHLORIDE 0.9 % (FLUSH) 0.9 %
5-40 SYRINGE (ML) INJECTION EVERY 12 HOURS SCHEDULED
Status: DISCONTINUED | OUTPATIENT
Start: 2023-01-25 | End: 2023-01-27 | Stop reason: HOSPADM

## 2023-01-25 RX ORDER — SODIUM CHLORIDE 9 MG/ML
25 INJECTION, SOLUTION INTRAVENOUS PRN
Status: DISCONTINUED | OUTPATIENT
Start: 2023-01-25 | End: 2023-01-27 | Stop reason: HOSPADM

## 2023-01-25 RX ORDER — PROMETHAZINE HYDROCHLORIDE 25 MG/1
25 SUPPOSITORY RECTAL EVERY 6 HOURS PRN
Status: DISCONTINUED | OUTPATIENT
Start: 2023-01-25 | End: 2023-01-27 | Stop reason: HOSPADM

## 2023-01-25 RX ORDER — SODIUM CHLORIDE 0.9 % (FLUSH) 0.9 %
5-40 SYRINGE (ML) INJECTION PRN
Status: DISCONTINUED | OUTPATIENT
Start: 2023-01-25 | End: 2023-01-27 | Stop reason: HOSPADM

## 2023-01-25 RX ADMIN — INSULIN GLARGINE 35 UNITS: 100 INJECTION, SOLUTION SUBCUTANEOUS at 21:15

## 2023-01-25 RX ADMIN — Medication 10 ML: at 09:00

## 2023-01-25 RX ADMIN — RANOLAZINE 500 MG: 500 TABLET, EXTENDED RELEASE ORAL at 08:29

## 2023-01-25 RX ADMIN — CEFEPIME 2000 MG: 2 INJECTION, POWDER, FOR SOLUTION INTRAVENOUS at 18:40

## 2023-01-25 RX ADMIN — AMLODIPINE BESYLATE 5 MG: 5 TABLET ORAL at 08:29

## 2023-01-25 RX ADMIN — VENLAFAXINE HYDROCHLORIDE 150 MG: 150 CAPSULE, EXTENDED RELEASE ORAL at 08:29

## 2023-01-25 RX ADMIN — LISINOPRIL 20 MG: 20 TABLET ORAL at 08:29

## 2023-01-25 RX ADMIN — ATORVASTATIN CALCIUM 10 MG: 10 TABLET, FILM COATED ORAL at 21:16

## 2023-01-25 RX ADMIN — ENOXAPARIN SODIUM 40 MG: 100 INJECTION SUBCUTANEOUS at 08:29

## 2023-01-25 RX ADMIN — PANTOPRAZOLE SODIUM 40 MG: 40 TABLET, DELAYED RELEASE ORAL at 06:09

## 2023-01-25 RX ADMIN — SODIUM CHLORIDE: 9 INJECTION, SOLUTION INTRAVENOUS at 18:38

## 2023-01-25 RX ADMIN — ONDANSETRON 4 MG: 4 TABLET, ORALLY DISINTEGRATING ORAL at 09:43

## 2023-01-25 RX ADMIN — BUPRENORPHINE AND NALOXONE 3 FILM: 2; .5 FILM BUCCAL; SUBLINGUAL at 12:57

## 2023-01-25 ASSESSMENT — PAIN SCALES - GENERAL
PAINLEVEL_OUTOF10: 0

## 2023-01-25 NOTE — CONSULTS
Gastroenterology Consult Note        Patient: Michael German  : 1955  Acct#:      Date:  2023    Subjective:       History of Present Illness  Patient is a 79 y.o.  female admitted with Acute delirium [R41.0]  Acute cystitis without hematuria [S64.11]  Acute metabolic encephalopathy [X70.12] who is seen in consult for N/V. H/o DM, chronic back pain. She had been on vicodin (weaned off in Dec) but now on suboxone. S/p cholecystectomy. S/p hiatal hernia repair. She reports nausea and vomiting since 2022. This has worsened recently. Can occur in the morning and then no further N/V that day. May get a vague epigastric discomfort with vomiting. Also taking edibles for N/V. H/o constipation. Can go a week without a BM. Occasional solid food and liquid dysphagia for a few months. Saw Dr Luis Pastor as new pt late Dec. Had AXR that was suggestive of constipation. Upper GI with SBFT was done which showed recurrent HH, reflux to lower thoracic esophagus, and was suggestive of esophageal dysmotility. Plan was for GES and EGD and colonoscopy. Then admitted earlier this month for N/V. EGD 22 with normal esophagus, antral gastritis. Biopsies negative for H. pylori. GES not able to be done inpatient due to nausea and inability to tolerate PO. She was started on Reglan and scopolamine patch and improved and was d/c home. She initially did okay at home without any nausea or vomiting for 5 to 7 days. Then she started becoming confused on Wednesday of last week. A few days later she was more confused and aggressive. Had some hallucinations. Also had nausea and vomiting. Called Dr. Luis Pastor and was told to come to the ER. Was admitted on . Found to have UTI. Started on antibiotics. Reglan and scopolamine were initially held here. She is having nausea and vomiting with or without p.o. intake. Not really much as far as abdominal pain.   In the past she admitted to some dysphagia but denies any of that now. No diarrhea, melena, hematochezia. Phenergan helps but Zofran does not help. She does have lipsmacking behavior which is very notable today. She tells me that she has been doing this for the past 6 to 7 months after getting dentures. Past Medical History:   Diagnosis Date    Anxiety     Diabetes mellitus ([address]     Hyperlipidemia     MVP (mitral valve prolapse)     Neck pain       Past Surgical History:   Procedure Laterality Date    CHOLECYSTECTOMY      HYSTERECTOMY (CERVIX STATUS UNKNOWN)      UPPER GASTROINTESTINAL ENDOSCOPY N/A 1/11/2023    EGD BIOPSY performed by Alyx Thomas MD at [address] [place] [address]      Past Endoscopic History: see hpi    Admission Meds  No current facility-administered medications on file prior to encounter. Current Outpatient Medications on File Prior to Encounter   Medication Sig Dispense Refill    scopolamine (TRANSDERM-SCOP) transdermal patch Place 1 patch onto the skin every 72 hours (Patient not taking: Reported on 1/24/2023) 10 patch 0    promethazine (PHENERGAN) 25 MG suppository Place 1 suppository rectally every 6 hours as needed for Nausea (Patient not taking: Reported on 1/24/2023) 30 suppository 0    amLODIPine (NORVASC) 5 MG tablet Take 1 tablet by mouth daily 30 tablet 1    metoclopramide (REGLAN) 5 MG tablet Take three times daily before meals as needed for nausea (Patient not taking: Reported on 1/24/2023) 90 tablet 0    pantoprazole (PROTONIX) 40 MG tablet Take 1 tablet by mouth daily 30 tablet 1    TRESIBA FLEXTOUCH 200 UNIT/ML SOPN Inject 110 Units into the skin at bedtime      lisinopril (PRINIVIL;ZESTRIL) 20 MG tablet Take 20 mg by mouth daily      ondansetron (ZOFRAN-ODT) 4 MG disintegrating tablet Place 4 mg under the tongue every 8 hours as needed for Nausea or Vomiting      pregabalin (LYRICA) 75 MG capsule Take 75 mg by mouth 2 times daily.       ranolazine (RANEXA) 500 MG extended release tablet Take 500 mg by mouth 2 times daily      simvastatin (ZOCOR) 20 MG tablet Take 20 mg by mouth nightly      NOVOLOG FLEXPEN 100 UNIT/ML injection pen Inject 6-40 Units into the skin 3 times daily (before meals) SSI (BG-110)/4 = # of units to give      buprenorphine-naloxone (SUBOXONE) 4-1 MG FILM SL film Place 1.5 Film under the tongue daily. venlafaxine (EFFEXOR XR) 150 MG extended release capsule Take 150 mg by mouth daily      metformin (GLUCOPHAGE) 1000 MG tablet Take 1,000 mg by mouth 2 times daily (with meals). Allergies  No Known Allergies   Social   Social History     Tobacco Use    Smoking status: Never    Smokeless tobacco: Not on file   Substance Use Topics    Alcohol use: No        History reviewed. No pertinent family history. Review of Systems  Pertinent items are noted in HPI. Physical Exam  Blood pressure (!) 152/82, pulse 79, temperature 98 °F (36.7 °C), temperature source Oral, resp. rate 18, height 5' 6\" (1.676 m), weight 240 lb (108.9 kg), SpO2 94 %. General appearance: alert, cooperative, no distress, appears stated age  Eyes: Anicteric  Head: Normocephalic, without obvious abnormality  Lungs: clear to auscultation bilaterally, Normal Effort  Heart: regular rate and rhythm, normal S1 and S2, no murmurs or rubs  Abdomen: soft, non-tender. Bowel sounds normal. No masses,  no organomegaly.    Extremities: atraumatic, no cyanosis or edema  Skin: warm and dry, no jaundice  Neuro: Grossly intact, A&OX3  Musculoskeletal: 5/5  strength BUE      Data Review:    Recent Labs     01/23/23 1913 01/24/23  0549   WBC 7.3 6.7   HGB 11.9* 11.6*   HCT 37.0 35.7*   MCV 82.6 82.3    325     Recent Labs     01/23/23 1913 01/24/23  0549    144   K 4.6 4.2    105   CO2 27 30   BUN 10 9   CREATININE 0.8 0.8     Recent Labs     01/23/23 1913   AST 21   ALT 11   BILITOT 0.5   ALKPHOS 108     Recent Labs     01/23/23 1913   LIPASE 6.0*     No results for input(s): PROTIME, INR in the last 72 hours. No results for input(s): PTT in the last 72 hours. No results for input(s): OCCULTBLD in the last 72 hours. Assessment:     Nausea and vomiting - began in Sept 2022 and symptoms have been worsening lately. Upper GI showed recurrence of hiatal hernia. There was concern for esophageal dysmotility and she did report dysphagia at last admission but now denies this. EGD 1/11 with normal esophagus, antral gastritis. Biopsies negative for H. pylori. Suspect symptoms from gastroparesis. Suboxone could be contributing. Will need 4-hour GES at some point but she is not able to tolerate this currently. Altered mental status - could be from any of these issues or meds: UTI, scoplamine, reglan, phenergan. UTI    Recommendations:   - antiemetics  - hesitant to use Reglan with her current lip smacking (although this started prior to using Reglan). - would avoid scopolamine patch if able as this could have played a role in her altered mental status. - considered Erythromycin or domperidone but QTc is elevated at 462.   - currently phenergan helping so would continue that. Discussed with Dr. Lelo Denise. Dr Harpal Cartwright will see patient tomorrow. Jose Luis Byrd PA-C  [address] [facility] saw pt 6/86/17. I agree with above. I saw pt 1/26/23, see that note for details.    Alatgracia Gu MD

## 2023-01-25 NOTE — PLAN OF CARE
Problem: Safety - Adult  Goal: Free from fall injury  1/25/2023 0000 by Reta Sweet RN  Outcome: Progressing  1/24/2023 1832 by Fabio Leslie RN  Outcome: Progressing  1/24/2023 1830 by Fabio Leslie RN  Outcome: Progressing     Problem: Chronic Conditions and Co-morbidities  Goal: Patient's chronic conditions and co-morbidity symptoms are monitored and maintained or improved  1/25/2023 0000 by Reta Sweet RN  Outcome: Progressing  1/24/2023 1832 by Fabio Leslie RN  Outcome: Progressing     Problem: Gastrointestinal - Adult  Goal: Minimal or absence of nausea and vomiting  1/25/2023 0000 by Reta Sweet RN  Outcome: Progressing  1/24/2023 1832 by Fabio Leslie RN  Outcome: Progressing  Goal: Maintains adequate nutritional intake  1/25/2023 0000 by Reta Sweet RN  Outcome: Progressing  1/24/2023 1832 by Fabio Leslie RN  Outcome: Progressing     Problem: Metabolic/Fluid and Electrolytes - Adult  Goal: Electrolytes maintained within normal limits  1/25/2023 0000 by Reta Sweet RN  Outcome: Progressing  1/24/2023 1832 by Fabio Leslie RN  Outcome: Progressing

## 2023-01-25 NOTE — PROGRESS NOTES
[facility] PROGRESS NOTE    1/25/2023 3:10 PM        Name: Dayna Womack . Admitted: 1/23/2023  Primary Care Provider: Morgan Porras (Tel: [phone])      Subjective:  . Patient admitted with confusion/aggression. She has had nausea and vomiting since early December. 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 She weaned off of this and then started going to [facility] and has been on Suboxone. She is also on marijuana Gummies. There is been concern for gastroparesis however patient could not complete an outpatient gastric emptying scan due to vomiting. She also has a history of hiatal hernia which has been repaired and fixed twice. She had an EGD during the previous admission which revealed some antral gastritis. Her symptoms resolved on a scopolamine patch. She was also started on Reglan 10 mg however she was discharged on 5 mg 3 times daily as needed however her  who is at bedside states that she was taking it scheduled. She was discharged on Friday the 13th and started having confusion the following Wednesday. He removed her patch and stop the Reglan Thursday night. She was no better by Monday. In the emergency room UA is suggestive of possible UTI once again. Previous cultures were positive for pansensitive E. coli. She had completed a course of antibiotics during her stay the last time. At this time the patient is alert and oriented however she had appears tired. She did vomit this morning. When the patient was on scopolamine and Reglan she did not have any nausea or vomiting at home. Family reports patient has had a brownish drainage in her underwear for a while. Today she is awake and alert. Still nauseated. Small amt of emesis. Has no appetite. Urine cultures positive for enterobacter.  Denies abdominal pain.    Reviewed interval ancillary notes    Current Medications  cefepime (MAXIPIME) 2,000 mg in sodium chloride 0.9 % 50 mL IVPB (mini-bag), Once   Followed by  cefepime (MAXIPIME) 2,000 mg in sodium chloride 0.9 % 50 mL IVPB (mini-bag), Q12H  promethazine (PHENERGAN) suppository 25 mg, Q6H PRN  lidocaine PF 1 % injection 5 mL, Once  sodium chloride flush 0.9 % injection 5-40 mL, 2 times per day  sodium chloride flush 0.9 % injection 5-40 mL, PRN  0.9 % sodium chloride infusion, PRN  amLODIPine (NORVASC) tablet 5 mg, Daily  lisinopril (PRINIVIL;ZESTRIL) tablet 20 mg, Daily  buprenorphine-naloxone (SUBOXONE) 2-0.5 MG SL film 3 Film, Daily  pantoprazole (PROTONIX) tablet 40 mg, QAM AC  ranolazine (RANEXA) extended release tablet 500 mg, BID  venlafaxine (EFFEXOR XR) extended release capsule 150 mg, Daily  dextrose bolus 10% 125 mL, PRN   Or  dextrose bolus 10% 250 mL, PRN  glucagon (rDNA) injection 1 mg, PRN  dextrose 10 % infusion, Continuous PRN  sodium chloride flush 0.9 % injection 5-40 mL, 2 times per day  sodium chloride flush 0.9 % injection 5-40 mL, PRN  0.9 % sodium chloride infusion, PRN  enoxaparin (LOVENOX) injection 40 mg, Daily  ondansetron (ZOFRAN-ODT) disintegrating tablet 4 mg, Q8H PRN   Or  ondansetron (ZOFRAN) injection 4 mg, Q6H PRN  acetaminophen (TYLENOL) tablet 650 mg, Q6H PRN   Or  acetaminophen (TYLENOL) suppository 650 mg, Q6H PRN  polyethylene glycol (GLYCOLAX) packet 17 g, Daily PRN  insulin lispro (HUMALOG) injection vial 0-8 Units, TID WC  insulin lispro (HUMALOG) injection vial 0-4 Units, Nightly  atorvastatin (LIPITOR) tablet 10 mg, Nightly  0.9 % sodium chloride infusion, Continuous  scopolamine (TRANSDERM-SCOP) transdermal patch 1 patch, Q72H  insulin glargine (LANTUS) injection vial 35 Units, Nightly      Objective:  BP (!) 162/84   Pulse 84   Temp 97.1 °F (36.2 °C) (Oral)   Resp 18   Ht 5' 6\" (1.676 m)   Wt 240 lb (108.9 kg)   SpO2 93%   BMI 38.74 kg/m²     Intake/Output Summary (Last 24 hours) at 1/25/2023 1510  Last data filed at 1/25/2023 1415  Gross per 24 hour   Intake 99.8 ml   Output 200 ml   Net -100.2 ml        Wt Readings from Last 3 Encounters:   01/24/23 240 lb (108.9 kg)   01/09/23 240 lb (108.9 kg)       General appearance:  Appears uncomfortable  Eyes: Sclera clear. Pupils equal.  ENT: Moist oral mucosa. Trachea midline, no adenopathy. Cardiovascular: Regular rhythm, normal S1, S2. No murmur. No edema in lower extremities  Respiratory: Not using accessory muscles. Good inspiratory effort. Clear to auscultation bilaterally, no wheeze or crackles. GI: Abdomen soft, no tenderness, not distended, normal bowel sounds  Musculoskeletal: No cyanosis in digits, neck supple  Neurology: CN 2-12 grossly intact. No speech or motor deficits  Psych: Normal affect. Alert and oriented in time, place and person  Skin: Warm, dry, normal turgor    Labs and Tests:  CBC:   Recent Labs     01/23/23 1913 01/24/23  0549   WBC 7.3 6.7   HGB 11.9* 11.6*    325       BMP:    Recent Labs     01/23/23 1913 01/24/23  0549    144   K 4.6 4.2    105   CO2 27 30   BUN 10 9   CREATININE 0.8 0.8   GLUCOSE 233* 139*       Hepatic:   Recent Labs     01/23/23 1913   AST 21   ALT 11   BILITOT 0.5   ALKPHOS 108         Problem List  Principal Problem:    Acute metabolic encephalopathy  Active Problems:    UTI (urinary tract infection)    Agitation    Probable Medication adverse effect    Type 2 diabetes mellitus (HCC)    HLD (hyperlipidemia)  Resolved Problems:    * No resolved hospital problems. *       Assessment & Plan:   Metabolic encephalopathy-at this stage it is not clear what the etiology of this is. During her recent hospitalization she did not have any confusion whatsoever. She had been on Reglan very briefly during the hospitalization and did not have any confusion even with the positive urine cultures.   It is possible that it is a combination of all of these things combined including the UTI along with the Reglan and the scopolamine patch (also on suboxone and marijuana gummies). Her symptoms however did persist 5 days beyond removal of the scopolamine patch and discontinuation of the Reglan. Patient is alert and oriented. She has had scopolamine patch on since Tuesday. UTI-previous cultures positive for E. coli. Now positive for enterobacter. Will change abx to Cefepime pending sensitivities. Chronic nausea and vomiting-has multiple things that could be contributing to this. ? Gastroparesis vs hiatal hernia. Also on suboxone, THC. DM 2-a1c 6.8. Currently on 35 of lantus and SSI. Continue current dose alone with SSI    Diet: ADULT DIET; Regular; 5 carb choices (75 gm/meal)  ADULT ORAL NUTRITION SUPPLEMENT; Breakfast, Dinner; Clear Liquid Oral Supplement  ADULT ORAL NUTRITION SUPPLEMENT; Lunch;  Fortified Pudding Oral Supplement  Code:Full Code  DVT PPX: lovenox      Quynh Garnett PA-C   1/25/2023 3:10 PM

## 2023-01-25 NOTE — PROGRESS NOTES
Arrived to place midline for pt with poor access options. Bedside RN Mo completed timeout in the room verifying the pt and procedure. Single Lumen Midline placed in the Left Basilic vein without complications. Blood return achieved and line flushed without resistance. Pt tolerated the procedure well. Verbal and written education provided. Handoff completed with CLAUDIA Fuller. Thank you for allowing our care and services.      Vessel 17%

## 2023-01-25 NOTE — PLAN OF CARE
Problem: Safety - Adult  Goal: Free from fall injury  Outcome: Progressing     Problem: Chronic Conditions and Co-morbidities  Goal: Patient's chronic conditions and co-morbidity symptoms are monitored and maintained or improved  Outcome: Progressing     Problem: Gastrointestinal - Adult  Goal: Minimal or absence of nausea and vomiting  Outcome: Progressing  Goal: Maintains adequate nutritional intake  Outcome: Progressing     Problem: Metabolic/Fluid and Electrolytes - Adult  Goal: Electrolytes maintained within normal limits  Outcome: Progressing     Problem: Nutrition Deficit:  Goal: Optimize nutritional status  Outcome: Progressing     Problem: Pain  Goal: Verbalizes/displays adequate comfort level or baseline comfort level  Outcome: Progressing

## 2023-01-25 NOTE — PROGRESS NOTES
Nutrition Note    RECOMMENDATIONS  PO Diet: add low fiber to existing CCC-5  ONS: trial Boost pudding, Glucerna, or Ensure clear-- provide what pt will tolerate  Nutrition Support: may need to consider alternative nutrition if pt unable to consume adequate PO nutrition     NUTRITION ASSESSMENT   Admission MST of 2 triggered nutrition evaluation. Pt wtih poor po intake and wt loss. Weight status difficult to assess, CBW is stated at 240 lb and pt also had stated wt of 240 lb on 1/12/23. PO intake was poor over previous admission d/t emesis and pt with gastroparesis. Spouse reports that pt started to eat better at time of d/c and was initially eating ok at home. Most recently, since the weekend she has not been able to eat or keep food down. Observed pt gagging and having a small amount of emesis while in the room. Pt agreeable to receive oral nutrition supplements, but unsure if she'll be able to consume them. Nutrition Related Findings: hx DM & gastroparesis; 1/11/23 EGD- gastritis; NS at 100 mL/hr; trace BLE edema; upper/lower dentures; active BS  Wounds: None  Nutrition Education:  Education not indicated   Nutrition Goals: PO intake 50% or greater     MALNUTRITION ASSESSMENT   Acute Illness  Malnutrition Status: At risk for malnutrition (Comment)  Findings of the 6 clinical characteristics of malnutrition:  Energy Intake:  50% or less of estimated energy requirements for 5 or more days  Weight Loss:  Unable to assess     Body Fat Loss:  No significant body fat loss     Muscle Mass Loss:  No significant muscle mass loss    Fluid Accumulation:  No significant fluid accumulation (trace BLE)     Strength:  Not Performed      NUTRITION DIAGNOSIS   Inadequate oral intake related to altered GI function as evidenced by poor intake prior to admission, intake 0-25%, nausea, vomiting      CURRENT NUTRITION THERAPIES  ADULT DIET;  Regular; 5 carb choices (75 gm/meal)     PO Intake: 0%   PO Supplement Intake:None Ordered      ANTHROPOMETRICS  Current Height: 5' 6\" (167.6 cm)  Current Weight: 240 lb (108.9 kg)    Admission weight:    Ideal Body Weight (IBW): 130 lbs  (59 kg)    Usual Bodyweight         BMI: 38.8      COMPARATIVE STANDARDS  Will evaluate once actual wt confirmed    The patient will be monitored per nutrition standards of care. Consult dietitian if additional nutrition interventions are needed prior to RD reassessment.      Ghada Ellis, LÁZARO, LD    Contact: [phone]

## 2023-01-25 NOTE — PROGRESS NOTES
Pt refused suppository, Pt needs IV access. Pt combative when trying to place a new IV. I grady Wooten.

## 2023-01-26 ENCOUNTER — APPOINTMENT (OUTPATIENT)
Dept: NUCLEAR MEDICINE | Age: 68
End: 2023-01-26
Payer: MEDICARE

## 2023-01-26 LAB
GLUCOSE BLD-MCNC: 146 MG/DL (ref 70–99)
GLUCOSE BLD-MCNC: 153 MG/DL (ref 70–99)
GLUCOSE BLD-MCNC: 166 MG/DL (ref 70–99)
GLUCOSE BLD-MCNC: 195 MG/DL (ref 70–99)
ORGANISM: ABNORMAL
PERFORMED ON: ABNORMAL
URINE CULTURE, ROUTINE: ABNORMAL

## 2023-01-26 PROCEDURE — 6360000002 HC RX W HCPCS: Performed by: INTERNAL MEDICINE

## 2023-01-26 PROCEDURE — 6370000000 HC RX 637 (ALT 250 FOR IP): Performed by: NURSE PRACTITIONER

## 2023-01-26 PROCEDURE — 2580000003 HC RX 258: Performed by: PHYSICIAN ASSISTANT

## 2023-01-26 PROCEDURE — 6370000000 HC RX 637 (ALT 250 FOR IP): Performed by: INTERNAL MEDICINE

## 2023-01-26 PROCEDURE — 6370000000 HC RX 637 (ALT 250 FOR IP): Performed by: PHYSICIAN ASSISTANT

## 2023-01-26 PROCEDURE — 2580000003 HC RX 258: Performed by: INTERNAL MEDICINE

## 2023-01-26 PROCEDURE — 6360000002 HC RX W HCPCS: Performed by: PHYSICIAN ASSISTANT

## 2023-01-26 PROCEDURE — 1200000000 HC SEMI PRIVATE

## 2023-01-26 RX ORDER — PREGABALIN 75 MG/1
75 CAPSULE ORAL 2 TIMES DAILY
Status: DISCONTINUED | OUTPATIENT
Start: 2023-01-26 | End: 2023-01-27 | Stop reason: HOSPADM

## 2023-01-26 RX ORDER — METOCLOPRAMIDE HYDROCHLORIDE 5 MG/ML
5 INJECTION INTRAMUSCULAR; INTRAVENOUS EVERY 6 HOURS
Status: DISCONTINUED | OUTPATIENT
Start: 2023-01-26 | End: 2023-01-27 | Stop reason: HOSPADM

## 2023-01-26 RX ADMIN — ONDANSETRON 4 MG: 2 INJECTION INTRAMUSCULAR; INTRAVENOUS at 00:04

## 2023-01-26 RX ADMIN — PREGABALIN 75 MG: 75 CAPSULE ORAL at 20:43

## 2023-01-26 RX ADMIN — Medication 10 ML: at 20:23

## 2023-01-26 RX ADMIN — Medication 10 ML: at 09:00

## 2023-01-26 RX ADMIN — BUPRENORPHINE AND NALOXONE 3 FILM: 2; .5 FILM BUCCAL; SUBLINGUAL at 09:22

## 2023-01-26 RX ADMIN — RANOLAZINE 500 MG: 500 TABLET, EXTENDED RELEASE ORAL at 20:23

## 2023-01-26 RX ADMIN — CEFEPIME 2000 MG: 2 INJECTION, POWDER, FOR SOLUTION INTRAVENOUS at 00:06

## 2023-01-26 RX ADMIN — SODIUM CHLORIDE, PRESERVATIVE FREE 10 ML: 5 INJECTION INTRAVENOUS at 09:00

## 2023-01-26 RX ADMIN — ENOXAPARIN SODIUM 40 MG: 100 INJECTION SUBCUTANEOUS at 09:22

## 2023-01-26 RX ADMIN — INSULIN GLARGINE 35 UNITS: 100 INJECTION, SOLUTION SUBCUTANEOUS at 20:42

## 2023-01-26 RX ADMIN — RANOLAZINE 500 MG: 500 TABLET, EXTENDED RELEASE ORAL at 00:03

## 2023-01-26 RX ADMIN — CEFEPIME 2000 MG: 2 INJECTION, POWDER, FOR SOLUTION INTRAVENOUS at 09:41

## 2023-01-26 RX ADMIN — ACETAMINOPHEN 650 MG: 325 TABLET ORAL at 20:23

## 2023-01-26 RX ADMIN — ATORVASTATIN CALCIUM 10 MG: 10 TABLET, FILM COATED ORAL at 20:23

## 2023-01-26 RX ADMIN — PROMETHAZINE HYDROCHLORIDE 25 MG: 25 SUPPOSITORY RECTAL at 12:36

## 2023-01-26 RX ADMIN — METOCLOPRAMIDE 5 MG: 5 INJECTION, SOLUTION INTRAMUSCULAR; INTRAVENOUS at 15:33

## 2023-01-26 RX ADMIN — VENLAFAXINE HYDROCHLORIDE 150 MG: 150 CAPSULE, EXTENDED RELEASE ORAL at 09:23

## 2023-01-26 RX ADMIN — AMLODIPINE BESYLATE 5 MG: 5 TABLET ORAL at 09:23

## 2023-01-26 RX ADMIN — METOCLOPRAMIDE 5 MG: 5 INJECTION, SOLUTION INTRAMUSCULAR; INTRAVENOUS at 12:36

## 2023-01-26 RX ADMIN — METOCLOPRAMIDE 5 MG: 5 INJECTION, SOLUTION INTRAMUSCULAR; INTRAVENOUS at 20:22

## 2023-01-26 RX ADMIN — CEFEPIME 2000 MG: 2 INJECTION, POWDER, FOR SOLUTION INTRAVENOUS at 22:03

## 2023-01-26 RX ADMIN — SODIUM CHLORIDE: 9 INJECTION, SOLUTION INTRAVENOUS at 09:39

## 2023-01-26 RX ADMIN — PANTOPRAZOLE SODIUM 40 MG: 40 TABLET, DELAYED RELEASE ORAL at 06:06

## 2023-01-26 RX ADMIN — LISINOPRIL 20 MG: 20 TABLET ORAL at 09:23

## 2023-01-26 RX ADMIN — Medication 10 ML: at 20:25

## 2023-01-26 RX ADMIN — RANOLAZINE 500 MG: 500 TABLET, EXTENDED RELEASE ORAL at 09:23

## 2023-01-26 ASSESSMENT — PAIN SCALES - GENERAL
PAINLEVEL_OUTOF10: 4
PAINLEVEL_OUTOF10: 2
PAINLEVEL_OUTOF10: 9
PAINLEVEL_OUTOF10: 2
PAINLEVEL_OUTOF10: 8

## 2023-01-26 ASSESSMENT — PAIN SCALES - WONG BAKER
WONGBAKER_NUMERICALRESPONSE: 2
WONGBAKER_NUMERICALRESPONSE: 4
WONGBAKER_NUMERICALRESPONSE: 4
WONGBAKER_NUMERICALRESPONSE: 2

## 2023-01-26 ASSESSMENT — PAIN DESCRIPTION - LOCATION
LOCATION: ABDOMEN
LOCATION: ABDOMEN

## 2023-01-26 ASSESSMENT — PAIN DESCRIPTION - PAIN TYPE
TYPE: ACUTE PAIN;CHRONIC PAIN
TYPE: ACUTE PAIN

## 2023-01-26 NOTE — PROGRESS NOTES
Shift assessment completed. Routine vitals completed. Scheduled medications given. Patient is awake, alert and oriented. Respirations are  unlabored. Patient does not appear to be in distress, resting in at this time.  is at bedside. Call light within reach.

## 2023-01-26 NOTE — PROGRESS NOTES
Mrs. Shelby Wheeler was unable to have her gastric emptying scan today. She did try but only was able to get a few sips of water down; unable to actually eat anything. She was transported back to room, CLAUDIA Hilliard was notified.       Nghia MACIAS, RT(MR)

## 2023-01-26 NOTE — PROGRESS NOTES
[facility]    1/26/2023 8:45 AM        Name: Parveen Lovelace . Admitted: 1/23/2023  Primary Care Provider: Lindsey Bates (Tel: [phone])      Subjective:  . Patient admitted with confusion/aggression. She has had nausea and vomiting since early December. 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 She weaned off of this and then started going to [facility] and has been on Suboxone. She is also on marijuana Gummies. There is been concern for gastroparesis however patient could not complete an outpatient gastric emptying scan due to vomiting. She also has a history of hiatal hernia which has been repaired and fixed twice. She had an EGD during the previous admission which revealed some antral gastritis. Her symptoms resolved on a scopolamine patch. She was also started on Reglan 10 mg however she was discharged on 5 mg 3 times daily as needed however her  who is at bedside states that she was taking it scheduled. She was discharged on Friday the 13th and started having confusion the following Wednesday. He removed her patch and stop the Reglan Thursday night. She was no better by Monday. In the emergency room UA is suggestive of possible UTI once again. Previous cultures were positive for pansensitive E. coli. She had completed a course of antibiotics during her stay the last time. At this time the patient is alert and oriented however she had appears tired. She did vomit this morning. When the patient was on scopolamine and Reglan she did not have any nausea or vomiting at home. Family reports patient has had a brownish drainage in her underwear for a while. Today she is awake and alert. Still nauseated. Small amt of emesis. Has no appetite. Urine cultures positive for enterobacter. Denies abdominal pain.  She was taken down for gastric emptying scan but could not tolerate this.      Reviewed interval ancillary notes    Current Medications  cefepime (MAXIPIME) 2,000 mg in sodium chloride 0.9 % 50 mL IVPB (mini-bag), Q12H  promethazine (PHENERGAN) suppository 25 mg, Q6H PRN  lidocaine PF 1 % injection 5 mL, Once  sodium chloride flush 0.9 % injection 5-40 mL, 2 times per day  sodium chloride flush 0.9 % injection 5-40 mL, PRN  0.9 % sodium chloride infusion, PRN  amLODIPine (NORVASC) tablet 5 mg, Daily  lisinopril (PRINIVIL;ZESTRIL) tablet 20 mg, Daily  buprenorphine-naloxone (SUBOXONE) 2-0.5 MG SL film 3 Film, Daily  pantoprazole (PROTONIX) tablet 40 mg, QAM AC  ranolazine (RANEXA) extended release tablet 500 mg, BID  venlafaxine (EFFEXOR XR) extended release capsule 150 mg, Daily  dextrose bolus 10% 125 mL, PRN   Or  dextrose bolus 10% 250 mL, PRN  glucagon (rDNA) injection 1 mg, PRN  dextrose 10 % infusion, Continuous PRN  sodium chloride flush 0.9 % injection 5-40 mL, 2 times per day  sodium chloride flush 0.9 % injection 5-40 mL, PRN  0.9 % sodium chloride infusion, PRN  enoxaparin (LOVENOX) injection 40 mg, Daily  ondansetron (ZOFRAN-ODT) disintegrating tablet 4 mg, Q8H PRN   Or  ondansetron (ZOFRAN) injection 4 mg, Q6H PRN  acetaminophen (TYLENOL) tablet 650 mg, Q6H PRN   Or  acetaminophen (TYLENOL) suppository 650 mg, Q6H PRN  polyethylene glycol (GLYCOLAX) packet 17 g, Daily PRN  insulin lispro (HUMALOG) injection vial 0-8 Units, TID WC  insulin lispro (HUMALOG) injection vial 0-4 Units, Nightly  atorvastatin (LIPITOR) tablet 10 mg, Nightly  0.9 % sodium chloride infusion, Continuous  insulin glargine (LANTUS) injection vial 35 Units, Nightly      Objective:  BP (!) 158/85   Pulse 88   Temp 98.2 °F (36.8 °C) (Oral)   Resp 18   Ht 5' 6\" (1.676 m)   Wt 240 lb (108.9 kg)   SpO2 93%   BMI 38.74 kg/m²     Intake/Output Summary (Last 24 hours) at 1/26/2023 0845  Last data filed at 1/25/2023 1855  Gross per 24 hour Intake 121.7 ml   Output --   Net 121.7 ml        Wt Readings from Last 3 Encounters:   01/24/23 240 lb (108.9 kg)   01/09/23 240 lb (108.9 kg)       General appearance:  Appears uncomfortable  Eyes: Sclera clear. Pupils equal.  ENT: Moist oral mucosa. Trachea midline, no adenopathy. Cardiovascular: Regular rhythm, normal S1, S2. No murmur. No edema in lower extremities  Respiratory: Not using accessory muscles. Good inspiratory effort. Clear to auscultation bilaterally, no wheeze or crackles. GI: Abdomen soft, no tenderness, not distended, normal bowel sounds  Musculoskeletal: No cyanosis in digits, neck supple  Neurology: CN 2-12 grossly intact. No speech or motor deficits  Psych: Normal affect.  Alert and oriented in time, place and person  Skin: Warm, dry, normal turgor    Labs and Tests:  CBC:   Recent Labs     01/23/23 1913 01/24/23  0549   WBC 7.3 6.7   HGB 11.9* 11.6*    325       BMP:    Recent Labs     01/23/23 1913 01/24/23  0549    144   K 4.6 4.2    105   CO2 27 30   BUN 10 9   CREATININE 0.8 0.8   GLUCOSE 233* 139*       Hepatic:   Recent Labs     01/23/23 1913   AST 21   ALT 11   BILITOT 0.5   ALKPHOS 108     Urine Cultures  Enterobacter cloacae complex (1)    Antibiotic Interpretation Microscan  Method Status    amoxicillin-clavulanate Resistant >16/8 mcg/mL BACTERIAL SUSCEPTIBILITY PANEL BY MACI     ampicillin Resistant >16 mcg/mL BACTERIAL SUSCEPTIBILITY PANEL BY MACI     ampicillin-sulbactam Resistant >16/8 mcg/mL BACTERIAL SUSCEPTIBILITY PANEL BY MACI     ceFAZolin Resistant >16 mcg/mL BACTERIAL SUSCEPTIBILITY PANEL BY MACI     cefepime Sensitive <=2 mcg/mL BACTERIAL SUSCEPTIBILITY PANEL BY MACI     cefTRIAXone Resistant >32 mcg/mL BACTERIAL SUSCEPTIBILITY PANEL BY MACI     cefuroxime Resistant >16 mcg/mL BACTERIAL SUSCEPTIBILITY PANEL BY MACI     ciprofloxacin Sensitive <=1 mcg/mL BACTERIAL SUSCEPTIBILITY PANEL BY MACI     ertapenem Sensitive <=0.5 mcg/mL BACTERIAL SUSCEPTIBILITY PANEL BY MACI     gentamicin Sensitive <=4 mcg/mL BACTERIAL SUSCEPTIBILITY PANEL BY MACI     meropenem Sensitive <=1 mcg/mL BACTERIAL SUSCEPTIBILITY PANEL BY MACI     nitrofurantoin Sensitive <=32 mcg/mL BACTERIAL SUSCEPTIBILITY PANEL BY MACI     piperacillin-tazobactam Intermediate 64 mcg/mL BACTERIAL SUSCEPTIBILITY PANEL BY MACI     trimethoprim-sulfamethoxazole Sensitive <=2/38 mcg/mL BACTERIAL SUSCEPTIBILITY PANEL BY MACI       Problem List  Principal Problem:    Acute metabolic encephalopathy  Active Problems:    UTI (urinary tract infection)    Agitation    Probable Medication adverse effect    Type 2 diabetes mellitus (HCC)    HLD (hyperlipidemia)  Resolved Problems:    * No resolved hospital problems. *       Assessment & Plan:   Metabolic encephalopathy-at this stage it is not clear what the etiology of this is. During her recent hospitalization she did not have any confusion whatsoever. She had been on Reglan very briefly during the hospitalization and did not have any confusion even with the positive urine cultures. It is possible that it is a combination of all of these things combined including the UTI along with the Reglan and the scopolamine patch (also on suboxone and marijuana gummies). Her symptoms however did persist 5 days beyond removal of the scopolamine patch and discontinuation of the Reglan. Patient is alert and oriented. Scopolamine patch removed last night. Currently alert and oriented. UTI-previous cultures positive for E. coli. Now positive for enterobacter. Continue Cefepime. Change to cipro at [place]. Chronic nausea and vomiting-has multiple things that could be contributing to this. ? Gastroparesis vs hiatal hernia. Also on suboxone, THC. Discussed with GI. Will try Reglan again 5 mg IV. Patients lip licking/smacking has been present since she had her teeth removed a year ago. Patient and  confirm this. DM 2-a1c 6.8. Currently on 35 of lantus and SSI.  Continue current dose alone with SSI    Diet: ADULT DIET; Regular; 5 carb choices (75 gm/meal)  ADULT ORAL NUTRITION SUPPLEMENT; Breakfast, Dinner; Clear Liquid Oral Supplement  ADULT ORAL NUTRITION SUPPLEMENT; Lunch;  Fortified Pudding Oral Supplement  Code:Full Code    DVT PPX: lashawn Machado PA-C   1/26/2023 8:45 AM

## 2023-01-26 NOTE — PLAN OF CARE
Problem: Safety - Adult  Goal: Free from fall injury  Outcome: Progressing     Problem: Chronic Conditions and Co-morbidities  Goal: Patient's chronic conditions and co-morbidity symptoms are monitored and maintained or improved  Outcome: Progressing     Problem: Gastrointestinal - Adult  Goal: Minimal or absence of nausea and vomiting  Outcome: Progressing  Goal: Maintains adequate nutritional intake  Outcome: Progressing     Problem: Metabolic/Fluid and Electrolytes - Adult  Goal: Electrolytes maintained within normal limits  Outcome: Progressing     Problem: Nutrition Deficit:  Goal: Optimize nutritional status  Outcome: Progressing     Problem: Pain  Goal: Verbalizes/displays adequate comfort level or baseline comfort level  Outcome: Progressing     Problem: ABCDS Injury Assessment  Goal: Absence of physical injury  Outcome: Progressing

## 2023-01-26 NOTE — PROGRESS NOTES
Gastroenterology Progress Note      Reagan Sanchez is a 79 y.o. female patient. 1. Acute delirium    2. Acute cystitis without hematuria        SUBJECTIVE:  went down for GES but drank water there and vomited so test not done. Started on reglan this am. No abdominal pain. Physical    VITALS:  BP (!) 152/73   Pulse 87   Temp 98.8 °F (37.1 °C) (Oral)   Resp 18   Ht 5' 6\" (1.676 m)   Wt 240 lb (108.9 kg)   SpO2 92%   BMI 38.74 kg/m²   TEMPERATURE:  Current - Temp: 98.8 °F (37.1 °C); Max - Temp  Av.7 °F (37.1 °C)  Min: 98.2 °F (36.8 °C)  Max: 99 °F (37.2 °C)    NAD  RRR   Lungs CTA Bilaterally, normal effort  Abdomen soft, ND, NT, no HSM, Bowel sounds normal  AAOx3     Data    Data Review:    Recent Labs     23  0549   WBC 7.3 6.7   HGB 11.9* 11.6*   HCT 37.0 35.7*   MCV 82.6 82.3    325     Recent Labs     23  0549    144   K 4.6 4.2    105   CO2 27 30   BUN 10 9   CREATININE 0.8 0.8     Recent Labs     23   AST 21   ALT 11   BILITOT 0.5   ALKPHOS 108     Recent Labs     23   LIPASE 6.0*     No results for input(s): PROTIME, INR in the last 72 hours. No results for input(s): PTT in the last 72 hours. ASSESSMENT     Nausea and vomiting - began in 2022 and symptoms have been worsening lately. Upper GI showed recurrence of hiatal hernia. There was concern for esophageal dysmotility and she did report dysphagia at last admission but now denies this. EGD  with normal esophagus, antral gastritis. Biopsies negative for H. pylori. She was started on reglan and scoplamine in  and nausea improved. Suspect symptoms from gastroparesis. unable to tolerate GES today. Reglan resumed. Altered mental status - likely from UTI and meds could have contributed. UTI      PLAN    - resume Reglan.  Pt does have lip smacking which she states started 6-7 months ago when she had teeth removed/got her dentures. Discussed risks of tardive dyskinesia with reglan. She understands and would like to continue with reglan. - phenergan PRN  - clear liquids as tolerated and advance to low fat. Low fiber diet as tolerated  - d/w primary team and plan is for her to see urology outpatient for recurrent UTIs    Discussed with PARIS Posadas    I have personally performed a face to face diagnostic evaluation on this patient. I have interviewed and examined the patient and I agree with the findings and recommended plan of care. In summary, my findings and plan are the following: mental status cleared and tolerating some clears today, abd soft/nontender. I d/w /daughter/son/pt today-- her last admit and this admit n/v/mental status changes were both in setting active UTI-- while she may have some underlying gastroparesis the UTI's have clearly been the trigger of n/v/mental status changes. Therefore rec:  - evaluatino for recurrent uti's and ?brown vaginal discharge per daughter report and treat current uti  - ok resume reglan since lip smacking started before reglan and mental status changes were uti induced  - hold off scopalamine for now to see if treating uti and adding reglan are enough to resolve symptoms  - hold off gastric emptying repeat attempt for now; can be done outpt when out of window of uti/fully recovered uti  - miralax prn to avoid constipation in case contributes to bloating/nausea  - outpt future colonoscopy as planned with dr tejada for constipation  - overall seems treating uti has been major trigger to improvement since admit.      Payam Ag MD  [address]

## 2023-01-26 NOTE — PLAN OF CARE
Problem: Safety - Adult  Goal: Free from fall injury  1/26/2023 0038 by Marleny Churhc RN  Outcome: Progressing     Problem: Chronic Conditions and Co-morbidities  Goal: Patient's chronic conditions and co-morbidity symptoms are monitored and maintained or improved  1/26/2023 0038 by Marleny Church RN  Outcome: Progressing     Problem: Gastrointestinal - Adult  Goal: Minimal or absence of nausea and vomiting  1/26/2023 0038 by Marleny Church RN  Outcome: Progressing  Goal: Maintains adequate nutritional intake  1/26/2023 0038 by Marleny Church RN  Outcome: Progressing     Problem: Metabolic/Fluid and Electrolytes - Adult  Goal: Electrolytes maintained within normal limits  1/26/2023 0038 by Marleny Church RN  Outcome: Progressing     Problem: Nutrition Deficit:  Goal: Optimize nutritional status  1/26/2023 0038 by Marleny Church RN  Outcome: Progressing     Problem: Pain  Goal: Verbalizes/displays adequate comfort level or baseline comfort level  1/26/2023 0038 by Marleny Church RN  Outcome: Progressing     Problem: ABCDS Injury Assessment  Goal: Absence of physical injury  Outcome: Progressing

## 2023-01-26 NOTE — PROGRESS NOTES
Pt complained of nausea and was given Zofran and after an hour she said medication didn't really helped her MD messaged and said pt could only receive suppository phenergan but pt declined it earlier.  said if she still complained of nausea again then he will administer the suppository for her as he usually does it at home.

## 2023-01-27 VITALS
HEART RATE: 82 BPM | BODY MASS INDEX: 38.57 KG/M2 | RESPIRATION RATE: 18 BRPM | DIASTOLIC BLOOD PRESSURE: 81 MMHG | WEIGHT: 240 LBS | HEIGHT: 66 IN | OXYGEN SATURATION: 92 % | SYSTOLIC BLOOD PRESSURE: 154 MMHG | TEMPERATURE: 98.7 F

## 2023-01-27 LAB
ANION GAP SERPL CALCULATED.3IONS-SCNC: 9 MMOL/L (ref 3–16)
BASOPHILS ABSOLUTE: 0 K/UL (ref 0–0.2)
BASOPHILS RELATIVE PERCENT: 0.5 %
BUN BLDV-MCNC: 5 MG/DL (ref 7–20)
CALCIUM SERPL-MCNC: 9 MG/DL (ref 8.3–10.6)
CHLORIDE BLD-SCNC: 106 MMOL/L (ref 99–110)
CO2: 28 MMOL/L (ref 21–32)
CREAT SERPL-MCNC: 0.6 MG/DL (ref 0.6–1.2)
EOSINOPHILS ABSOLUTE: 0.1 K/UL (ref 0–0.6)
EOSINOPHILS RELATIVE PERCENT: 1.1 %
GFR SERPL CREATININE-BSD FRML MDRD: >60 ML/MIN/{1.73_M2}
GLUCOSE BLD-MCNC: 162 MG/DL (ref 70–99)
GLUCOSE BLD-MCNC: 73 MG/DL (ref 70–99)
GLUCOSE BLD-MCNC: 77 MG/DL (ref 70–99)
HCT VFR BLD CALC: 35.7 % (ref 36–48)
HEMOGLOBIN: 11.3 G/DL (ref 12–16)
LYMPHOCYTES ABSOLUTE: 1.7 K/UL (ref 1–5.1)
LYMPHOCYTES RELATIVE PERCENT: 31 %
MAGNESIUM: 1.7 MG/DL (ref 1.8–2.4)
MCH RBC QN AUTO: 25.8 PG (ref 26–34)
MCHC RBC AUTO-ENTMCNC: 31.6 G/DL (ref 31–36)
MCV RBC AUTO: 81.6 FL (ref 80–100)
MONOCYTES ABSOLUTE: 0.5 K/UL (ref 0–1.3)
MONOCYTES RELATIVE PERCENT: 9.4 %
NEUTROPHILS ABSOLUTE: 3.3 K/UL (ref 1.7–7.7)
NEUTROPHILS RELATIVE PERCENT: 58 %
PDW BLD-RTO: 14.3 % (ref 12.4–15.4)
PERFORMED ON: ABNORMAL
PERFORMED ON: NORMAL
PLATELET # BLD: 265 K/UL (ref 135–450)
PMV BLD AUTO: 6.5 FL (ref 5–10.5)
POTASSIUM REFLEX MAGNESIUM: 3.3 MMOL/L (ref 3.5–5.1)
RBC # BLD: 4.38 M/UL (ref 4–5.2)
SODIUM BLD-SCNC: 143 MMOL/L (ref 136–145)
WBC # BLD: 5.6 K/UL (ref 4–11)

## 2023-01-27 PROCEDURE — 6370000000 HC RX 637 (ALT 250 FOR IP): Performed by: PHYSICIAN ASSISTANT

## 2023-01-27 PROCEDURE — 83735 ASSAY OF MAGNESIUM: CPT

## 2023-01-27 PROCEDURE — 6370000000 HC RX 637 (ALT 250 FOR IP): Performed by: NURSE PRACTITIONER

## 2023-01-27 PROCEDURE — 6360000002 HC RX W HCPCS: Performed by: INTERNAL MEDICINE

## 2023-01-27 PROCEDURE — 6360000002 HC RX W HCPCS: Performed by: PHYSICIAN ASSISTANT

## 2023-01-27 PROCEDURE — 6370000000 HC RX 637 (ALT 250 FOR IP): Performed by: INTERNAL MEDICINE

## 2023-01-27 PROCEDURE — 85025 COMPLETE CBC W/AUTO DIFF WBC: CPT

## 2023-01-27 PROCEDURE — 80048 BASIC METABOLIC PNL TOTAL CA: CPT

## 2023-01-27 PROCEDURE — 36415 COLL VENOUS BLD VENIPUNCTURE: CPT

## 2023-01-27 RX ORDER — POTASSIUM CHLORIDE 20 MEQ/1
20 TABLET, EXTENDED RELEASE ORAL 2 TIMES DAILY WITH MEALS
Status: DISCONTINUED | OUTPATIENT
Start: 2023-01-27 | End: 2023-01-27 | Stop reason: HOSPADM

## 2023-01-27 RX ORDER — MAGNESIUM SULFATE 1 G/100ML
1000 INJECTION INTRAVENOUS ONCE
Status: COMPLETED | OUTPATIENT
Start: 2023-01-27 | End: 2023-01-27

## 2023-01-27 RX ORDER — CIPROFLOXACIN 500 MG/1
500 TABLET, FILM COATED ORAL EVERY 12 HOURS SCHEDULED
Qty: 16 TABLET | Refills: 0 | Status: SHIPPED | OUTPATIENT
Start: 2023-01-27 | End: 2023-02-04

## 2023-01-27 RX ORDER — GREEN TEA/HOODIA GORDONII 315-12.5MG
1 CAPSULE ORAL 2 TIMES DAILY
Qty: 14 TABLET | Refills: 0 | Status: SHIPPED | OUTPATIENT
Start: 2023-01-27 | End: 2023-02-03

## 2023-01-27 RX ORDER — METOCLOPRAMIDE 5 MG/1
5 TABLET ORAL
Qty: 90 TABLET | Refills: 0
Start: 2023-01-27

## 2023-01-27 RX ORDER — POLYETHYLENE GLYCOL 3350 17 G/17G
17 POWDER, FOR SOLUTION ORAL DAILY
Qty: 1530 G | Refills: 1 | Status: SHIPPED | OUTPATIENT
Start: 2023-01-27 | End: 2023-02-26

## 2023-01-27 RX ORDER — CIPROFLOXACIN 500 MG/1
500 TABLET, FILM COATED ORAL EVERY 12 HOURS SCHEDULED
Status: DISCONTINUED | OUTPATIENT
Start: 2023-01-27 | End: 2023-01-27 | Stop reason: HOSPADM

## 2023-01-27 RX ADMIN — LISINOPRIL 20 MG: 20 TABLET ORAL at 09:53

## 2023-01-27 RX ADMIN — RANOLAZINE 500 MG: 500 TABLET, EXTENDED RELEASE ORAL at 09:53

## 2023-01-27 RX ADMIN — PANTOPRAZOLE SODIUM 40 MG: 40 TABLET, DELAYED RELEASE ORAL at 05:38

## 2023-01-27 RX ADMIN — BUPRENORPHINE AND NALOXONE 3 FILM: 2; .5 FILM BUCCAL; SUBLINGUAL at 10:54

## 2023-01-27 RX ADMIN — ENOXAPARIN SODIUM 40 MG: 100 INJECTION SUBCUTANEOUS at 09:53

## 2023-01-27 RX ADMIN — CIPROFLOXACIN 500 MG: 500 TABLET, FILM COATED ORAL at 09:53

## 2023-01-27 RX ADMIN — MAGNESIUM SULFATE HEPTAHYDRATE 1000 MG: 1 INJECTION, SOLUTION INTRAVENOUS at 09:58

## 2023-01-27 RX ADMIN — AMLODIPINE BESYLATE 5 MG: 5 TABLET ORAL at 09:53

## 2023-01-27 RX ADMIN — METOCLOPRAMIDE 5 MG: 5 INJECTION, SOLUTION INTRAMUSCULAR; INTRAVENOUS at 09:53

## 2023-01-27 RX ADMIN — PREGABALIN 75 MG: 75 CAPSULE ORAL at 09:53

## 2023-01-27 RX ADMIN — VENLAFAXINE HYDROCHLORIDE 150 MG: 150 CAPSULE, EXTENDED RELEASE ORAL at 09:53

## 2023-01-27 RX ADMIN — METOCLOPRAMIDE 5 MG: 5 INJECTION, SOLUTION INTRAMUSCULAR; INTRAVENOUS at 05:38

## 2023-01-27 RX ADMIN — POTASSIUM CHLORIDE 20 MEQ: 1500 TABLET, EXTENDED RELEASE ORAL at 09:59

## 2023-01-27 NOTE — PROGRESS NOTES
Gastroenterology Progress Note      Kirstin Cornell is a 79 y.o. female patient. 1. Acute delirium    2. Acute cystitis without hematuria        SUBJECTIVE:  Feels much better. No further N/V. Tolerated clear liquids. No abdominal pain. Daughter at bedside. Physical    VITALS:  BP (!) 154/81   Pulse 82   Temp 98.7 °F (37.1 °C) (Oral)   Resp 18   Ht 5' 6\" (1.676 m)   Wt 240 lb (108.9 kg)   SpO2 92%   BMI 38.74 kg/m²   TEMPERATURE:  Current - Temp: 98.7 °F (37.1 °C); Max - Temp  Av.6 °F (37 °C)  Min: 98.4 °F (36.9 °C)  Max: 99 °F (37.2 °C)    NAD  RRR   Lungs CTA Bilaterally, normal effort  Abdomen soft, ND, NT, no HSM, Bowel sounds normal  AAOx3     Data    Data Review:    Recent Labs     23  0714   WBC 5.6   HGB 11.3*   HCT 35.7*   MCV 81.6          Recent Labs     23  0714      K 3.3*      CO2 28   BUN 5*   CREATININE 0.6       No results for input(s): AST, ALT, ALB, BILIDIR, BILITOT, ALKPHOS in the last 72 hours. No results for input(s): LIPASE, AMYLASE in the last 72 hours. No results for input(s): PROTIME, INR in the last 72 hours. No results for input(s): PTT in the last 72 hours. ASSESSMENT     Nausea and vomiting - began in 2022 and symptoms have been worsening lately. Upper GI showed recurrence of hiatal hernia. There was concern for esophageal dysmotility and she did report dysphagia at last admission but now denies this. EGD  with normal esophagus, antral gastritis. Biopsies negative for H. pylori. She was started on reglan and scoplamine in  and nausea improved. Suspect N/V is from gastroparesi and current symptoms exacerbated by UTI.  unable to tolerate GES here. Reglan resumed and uti now treated and she feels much better. Pt does have lip smacking which she states started 6-7 months ago when she had teeth removed/got her dentures. Discussed risks of tardive dyskinesia with reglan.  She understands and would like to continue with reglan. Altered mental status - likely from UTI. She will see urology outpatient as she has had 2 UTIs recently. UTI - outpt eval with urology. PLAN    -gastroparesis diet  - Reglan 5 mg TID. Can change to PRN is feeling ok after UTI treated. If symptoms worsen then 5 QID or 10 QID. - miralax PRN    - f/u with Dr Hurtado Au     Will sign off. Please call if questions. Discussed with Dr. Sinan Moss PA-C  [facility]      I have personally performed a face to face diagnostic evaluation on this patient. I have interviewed and examined the patient and I agree with the findings and recommended plan of care. In summary, my findings and plan are the following: n/v resolved with treatment of uti and resuming reglan, tolerating po, abd soft. Agree treat uti and urology follow up eval ?why frequent recurrent uti's, if continues feel well s/p uti then wean off reglan as outpt (discussed with pt/daughter/), also miralax to avoid constipation, hold off scopalamine, also per  suboxone formulation being changed to one that causes less nausea per prescribing physician, can f/u dr go as outpt. Otherwise will sign off, call if needed. Discussed with pt/daughter/.        Ashley Lindo MD  [address]

## 2023-01-27 NOTE — PROGRESS NOTES
Shift assessment completed. Routine vitals stable. Scheduled medications given by CLAUDIA Patton. Patient is awake, alert and oriented. Respirations are easy and unlabored. Patient does not appear to be in distress, resting comfortably at this time. Call light within reach. Fall precautions in place.

## 2023-01-27 NOTE — PROGRESS NOTES
Night shift assessment completed. Routine vitals have been obtained. Scheduled medications given. Patient is awake, alert and oriented/ talking to staff. Respirations are easy and unlabored with no c/o SOB. Skin has been assessed per writer. IV site is C/D/I. Patient does not appear to be in distress. Call light within reach. Standard safety measures are in place. All needs have been met at this time. Family is ar bedside.

## 2023-01-27 NOTE — PLAN OF CARE
Problem: Safety - Adult  Goal: Free from fall injury  1/26/2023 2221 by Justin Orellana RN  Outcome: Progressing  1/26/2023 1539 by Sisi Rodas RN  Outcome: Progressing     Problem: Chronic Conditions and Co-morbidities  Goal: Patient's chronic conditions and co-morbidity symptoms are monitored and maintained or improved  1/26/2023 2221 by Justin Orellana RN  Outcome: Progressing  1/26/2023 1539 by Sisi Rodas RN  Outcome: Progressing     Problem: Gastrointestinal - Adult  Goal: Minimal or absence of nausea and vomiting  1/26/2023 2221 by Justin Orellana RN  Outcome: Progressing  1/26/2023 1539 by Sisi Rodas RN  Outcome: Progressing  Goal: Maintains adequate nutritional intake  1/26/2023 2221 by Justin Orellana RN  Outcome: Progressing  1/26/2023 1539 by Sisi Rodas RN  Outcome: Progressing     Problem: Metabolic/Fluid and Electrolytes - Adult  Goal: Electrolytes maintained within normal limits  1/26/2023 2221 by Justin Orellana RN  Outcome: Progressing  1/26/2023 1539 by Sisi Rodas RN  Outcome: Progressing     Problem: Nutrition Deficit:  Goal: Optimize nutritional status  1/26/2023 2221 by Justin Orellana RN  Outcome: Progressing  1/26/2023 1539 by Sisi Rodas RN  Outcome: Progressing     Problem: Pain  Goal: Verbalizes/displays adequate comfort level or baseline comfort level  1/26/2023 2221 by Justin Orellana RN  Outcome: Progressing  1/26/2023 1539 by Sisi Rodas RN  Outcome: Progressing     Problem: ABCDS Injury Assessment  Goal: Absence of physical injury  1/26/2023 2221 by Justin Orellana RN  Outcome: Progressing  1/26/2023 1539 by Sisi Rodas RN  Outcome: Progressing

## 2023-01-27 NOTE — DISCHARGE SUMMARY
[facility]    Patient Demographics    Kala. Luz Mcleod  Date of Birth. 1955  MRN. [id number]     Primary care provider. [address] [place]  (Tel: [phone])    Admit date: 1/23/2023    Discharge date (blank if same as Note Date): Note Date: 1/27/2023     Reason for Hospitalization. Chief Complaint   Patient presents with    Fatigue     Pt states that she was in the hospital and released on Friday. Pt states that she is still having nausea no vomiting, feels weak. Pt states that she talked to Dr. Antonia Feliciano and he told her to come back up to the hospital.          Significant Findings. Principal Problem:    Acute metabolic encephalopathy  Active Problems:    UTI (urinary tract infection)    Agitation    Probable Medication adverse effect    Type 2 diabetes mellitus (HCC)    HLD (hyperlipidemia)  Resolved Problems:    * No resolved hospital problems. *       Problems and results from this hospitalization that need follow up. UTI  Vomiting  Chronic pain    Significant test results and incidental findings.   Enterobacter cloacae complex (1)    Antibiotic Interpretation Microscan  Method Status    amoxicillin-clavulanate Resistant >16/8 mcg/mL BACTERIAL SUSCEPTIBILITY PANEL BY MACI     ampicillin Resistant >16 mcg/mL BACTERIAL SUSCEPTIBILITY PANEL BY MACI     ampicillin-sulbactam Resistant >16/8 mcg/mL BACTERIAL SUSCEPTIBILITY PANEL BY MACI     ceFAZolin Resistant >16 mcg/mL BACTERIAL SUSCEPTIBILITY PANEL BY MACI     cefepime Sensitive <=2 mcg/mL BACTERIAL SUSCEPTIBILITY PANEL BY MACI     cefTRIAXone Resistant >32 mcg/mL BACTERIAL SUSCEPTIBILITY PANEL BY MACI     cefuroxime Resistant >16 mcg/mL BACTERIAL SUSCEPTIBILITY PANEL BY MACI     ciprofloxacin Sensitive <=1 mcg/mL BACTERIAL SUSCEPTIBILITY PANEL BY MACI     ertapenem Sensitive <=0.5 mcg/mL BACTERIAL SUSCEPTIBILITY PANEL BY MACI gentamicin Sensitive <=4 mcg/mL BACTERIAL SUSCEPTIBILITY PANEL BY MACI     meropenem Sensitive <=1 mcg/mL BACTERIAL SUSCEPTIBILITY PANEL BY MACI     nitrofurantoin Sensitive <=32 mcg/mL BACTERIAL SUSCEPTIBILITY PANEL BY MACI     piperacillin-tazobactam Intermediate 64 mcg/mL BACTERIAL SUSCEPTIBILITY PANEL BY MACI     trimethoprim-sulfamethoxazole Sensitive <=2/38 mcg/mL BACTERIAL SUSCEPTIBILITY PANEL BY MACI       Invasive procedures and treatments. None     Hospital Course. Patient presented to the hospital with confusion/aggression. She has had nausea and vomiting since early December. 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 She weaned off of this and then started going to [facility] in early December and has been on Suboxone. She is also on marijuana Gummies which she started around the same time. ere is been concern for gastroparesis however patient could not complete an outpatient gastric emptying scan due to vomiting. She also has a history of hiatal hernia which has been repaired and fixed twice. She had an EGD during the previous admission which revealed some antral gastritis. Her symptoms resolved on a scopolamine patch. She was also started on Reglan 10 mg however she was discharged on 5 mg 3 times daily as needed however her  who is at bedside states that she was taking it scheduled. She was discharged on Friday the 13th and started having confusion the following Wednesday. He removed her patch and stop the Reglan Thursday night. She was no better by Monday. In the emergency room UA is suggestive of possible UTI once again. Previous cultures were positive for pansensitive E. coli. She had completed a course of antibiotics during her stay the last time. At this time the patient is alert and oriented however she had appears tired. She did vomit this morning. When the patient was on scopolamine and Reglan she did not have any nausea or vomiting at home.   Family reports patient has had a brownish drainage in her underwear for a while    Patient was admitted and started on Rocephin. She was also initially started on scopolamine patch however this was removed after 24 hours. She was seen by GI. She attempted a gastric emptying scan but had vomited when she was given water. She was then restarted on Reglan which she tolerated well. Her urine cultures were actually positive for Enterobacter. Her antibiotics were switched to cefepime. At this stage it is still not clear what was causing her confusion however it could be her urinary tract infection versus the combination of the scopolamine patch and Reglan and UTI and Suboxone. Patient has been alert and oriented throughout her current hospitalization. She will benefit from gastric emptying scan on an outpatient basis once she can tolerate this. I am concerned however that her symptoms of nausea and vomiting seem to coincide from when she started Suboxone. I have encouraged her to follow-up with Keshia to look for possible decreasing dose versus alternatives. I Have also referred her to urology for her recurrent UTI. Patient and her daughter have right groin pain tardive dyskinesia which can be prominent. They understand. Patient does already lick her lips and gums since having her teeth pulled last year. Patient is stable at time of discharge. Consults. IP CONSULT TO GI    Physical examination on discharge day. BP (!) 154/81   Pulse 82   Temp 98.7 °F (37.1 °C) (Oral)   Resp 18   Ht 5' 6\" (1.676 m)   Wt 240 lb (108.9 kg)   SpO2 92%   BMI 38.74 kg/m²   General appearance. Alert. Looks comfortable. HEENT. Sclera clear. Moist mucus membranes. Cardiovascular. Regular rate and rhythm, normal S1, S2. No murmur. Respiratory. Not using accessory muscles. Clear to auscultation bilaterally, no wheeze. Gastrointestinal. Abdomen soft, non-tender, not distended, normal bowel sounds  Neurology. Facial symmetry.  No speech deficits. Moving all extremities equally. Extremities. No edema in lower extremities. Skin. Warm, dry, normal turgor    Condition at time of discharge stable    Medication instructions provided to patient at discharge.      Medication List        START taking these medications      ciprofloxacin 500 MG tablet  Commonly known as: CIPRO  Take 1 tablet by mouth every 12 hours for 8 days     metoclopramide 5 MG tablet  Commonly known as: REGLAN  Take three times daily before meals as needed for nausea     Probiotic Acidophilus Tabs  Take 1 tablet by mouth 2 times daily for 7 days            CONTINUE taking these medications      amLODIPine 5 MG tablet  Commonly known as: NORVASC  Take 1 tablet by mouth daily     buprenorphine-naloxone 4-1 MG Film SL film  Commonly known as: SUBOXONE     lisinopril 20 MG tablet  Commonly known as: PRINIVIL;ZESTRIL     metFORMIN 1000 MG tablet  Commonly known as: GLUCOPHAGE     NovoLOG FlexPen 100 UNIT/ML injection pen  Generic drug: insulin aspart     ondansetron 4 MG disintegrating tablet  Commonly known as: ZOFRAN-ODT     pantoprazole 40 MG tablet  Commonly known as: PROTONIX  Take 1 tablet by mouth daily     pregabalin 75 MG capsule  Commonly known as: LYRICA     promethazine 25 MG suppository  Commonly known as: PHENERGAN  Place 1 suppository rectally every 6 hours as needed for Nausea     ranolazine 500 MG extended release tablet  Commonly known as: RANEXA     simvastatin 20 MG tablet  Commonly known as: ZOCOR     Tresiba FlexTouch 200 UNIT/ML Sopn  Generic drug: Insulin Degludec     venlafaxine 150 MG extended release capsule  Commonly known as: EFFEXOR XR            STOP taking these medications      scopolamine transdermal patch  Commonly known as: TRANSDERM-SCOP               Where to Get Your Medications        These medications were sent to [facility] [address] [address]      Phone: [phone]   ciprofloxacin 500 MG tablet  Probiotic Acidophilus Tabs         Discharge recommendations given to patient. Follow Up. PCP and Keshia in 1 week, Dr Johnie Gonzalez in 2 weeks, Urology in 2 weeks   Disposition. home  Activity. activity as tolerated  Diet: ADULT ORAL NUTRITION SUPPLEMENT; Breakfast, Dinner; Clear Liquid Oral Supplement  ADULT ORAL NUTRITION SUPPLEMENT; Lunch; Fortified Pudding Oral Supplement  ADULT DIET; Regular; 5 carb choices (75 gm/meal); Low Fiber      Spent 40minutes in discharge process. Signed:   Mile Yang PA-C     1/27/2023 9:10 AM

## 2023-01-27 NOTE — DISCHARGE INSTR - COC
Continuity of Care Form    Patient Name: Katlin Carson   :    MRN:  [id number]    Admit date:  2023  Discharge date:  ***    Code Status Order: Full Code   Advance Directives:     Admitting Physician:  Jonny Browne MD  PCP: Dandy Hill    Discharging Nurse: [facility] Unit/Room#: [id number]-[id number]  Discharging Unit Phone Number: ***    Emergency Contact:   Extended Emergency Contact Information  Primary Emergency Contact: [facility] [place]  Address: Jan Graf [address] [place] [address] of [address] Phone: [phone]  Relation: Spouse    Past Surgical History:  Past Surgical History:   Procedure Laterality Date    CHOLECYSTECTOMY      HYSTERECTOMY (CERVIX STATUS UNKNOWN)      UPPER GASTROINTESTINAL ENDOSCOPY N/A 2023    [facility]       Immunization History: There is no immunization history on file for this patient.     Active Problems:  Patient Active Problem List   Diagnosis Code    UTI (urinary tract infection) R47.3    Acute metabolic encephalopathy F68.18    Agitation R45.1    Probable Medication adverse effect T50.905A    Type 2 diabetes mellitus (HCC) E11.9    HLD (hyperlipidemia) E78.5       Isolation/Infection:   Isolation            Contact          Patient Infection Status       Infection Onset Added Last Indicated Last Indicated By Review Planned Expiration Resolved Resolved By    MDRO (multi-drug resistant organism) 23 Culture, Urine                Nurse Assessment:  Last Vital Signs: BP (!) 154/81   Pulse 82   Temp 98.7 °F (37.1 °C) (Oral)   Resp 18   Ht 5' 6\" (1.676 m)   Wt 240 lb (108.9 kg)   SpO2 92%   BMI 38.74 kg/m²     Last documented pain score (0-10 scale): Pain Level: 2  Last Weight:   Wt Readings from Last 1 Encounters:   23 240 lb (108.9 kg)     Mental Status:  {IP PT MENTAL STATUS:}    IV Access:  { ANNEL IV LUXCritical access hospital:968621371}    Nursing Mobility/ADLs:  Walking   {CHP DME VMO}  Transfer  {CHP DME GZCF:882219910}  Bathing  {CHP DME EEEV:480744367}  Dressing  {CHP DME MCAW:845242021}  Toileting  {CHP DME DSCB:430830386}  Feeding  {P DME CLVT:357476902}  Med Admin  {P DME TGXI:367609777}  Med Delivery   {[facility] MED Delivery:517511110}    Wound Care Documentation and Therapy:        Elimination:  Continence: Bowel: {YES / KT:16965}  Bladder: {YES / UU:26593}  Urinary Catheter: {Urinary Catheter:646953419}   Colostomy/Ileostomy/Ileal Conduit: {YES / U}       Date of Last BM: ***    Intake/Output Summary (Last 24 hours) at 2023 1404  Last data filed at 2023 0800  Gross per 24 hour   Intake 1730.13 ml   Output --   Net 1730.13 ml     I/O last 3 completed shifts:   In: 1500.1 [P.O.:907.4; I.V.:470.5; IV Piggyback:122.2]  Out: -     Safety Concerns:     [address] [employer] Safety Concerns:095524651}    Impairments/Disabilities:      [address] [employer] Impairments/Disabilities:405747462}    Nutrition Therapy:  Current Nutrition Therapy:   [address] [employer] Diet List:579969938}    Routes of Feeding: {[facility] DME Other Feedings:614363616}  Liquids: {Slp liquid thickness:10186}  Daily Fluid Restriction: {CHP DME Yes amt example:387313850}  Last Modified Barium Swallow with Video (Video Swallowing Test): {Done Not Done HFCL:538017785}    Treatments at the Time of Hospital Discharge:   Respiratory Treatments: ***  Oxygen Therapy:  {Therapy; copd oxygen:38136}  Ventilator:    { MARGARITA Vent OVXD:632465573}    Rehab Therapies: {THERAPEUTIC INTERVENTION:6383945729}  Weight Bearing Status/Restrictions: [address]  Weight Bearin}  Other Medical Equipment (for information only, NOT a DME order):  {EQUIPMENT:211790508}  Other Treatments: ***    Patient's personal belongings (please select all that are sent with patient):  {ALBERTO DME Belongings:552855410}    RN SIGNATURE:  {Esignature:564513016}    CASE MANAGEMENT/SOCIAL WORK SECTION    Inpatient Status Date: ***    Readmission Risk Assessment Score:  Readmission Risk              Risk of Unplanned Readmission:  16           Discharging to Facility/ Agency   Name:   Address:  Phone:  Fax:    Dialysis Facility (if applicable)   Name:  Address:  Dialysis Schedule:  Phone:  Fax:    / signature: {Esignature:031860110}    PHYSICIAN SECTION    Prognosis: {Prognosis:4653345463}    Condition at Discharge: [address] Patient Condition:495825852}    Rehab Potential (if transferring to Rehab): {Prognosis:7763857156}    Recommended Labs or Other Treatments After Discharge: ***    Physician Certification: I certify the above information and transfer of Shira Austin  is necessary for the continuing treatment of the diagnosis listed and that she requires {Admit to Appropriate Level of Care:82684} for {GREATER/LESS:210739905} 30 days.      Update Admission H&P: {CHP DME Changes in KFUCR:865656847}    PHYSICIAN SIGNATURE:  {Esignature:355334194}

## (undated) DEVICE — ENDOSCOPIC KIT 6X3/16 FT COLON W/ 1.1 OZ 2 GWN W/O BRSH

## (undated) DEVICE — VALVE SUCTION AIR H2O SET ORCA POD + DISP

## (undated) DEVICE — AIR/WATER CLEANING ADAPTER FOR OLYMPUS® GI ENDOSCOPE: Brand: BULLDOG®

## (undated) DEVICE — BW-412T DISP COMBO CLEANING BRUSH: Brand: SINGLE USE COMBINATION CLEANING BRUSH

## (undated) DEVICE — SOLUTION IV IRRIG WATER 500ML POUR BRL ST 2F7113

## (undated) DEVICE — FORCEPS BX L240CM WRK CHN 2.8MM STD CAP W/ NDL MIC MESH

## (undated) DEVICE — MOUTHPIECE ENDOSCP L CTRL OPN AND SIDE PORTS DISP